# Patient Record
Sex: MALE | Race: BLACK OR AFRICAN AMERICAN | Employment: UNEMPLOYED | URBAN - METROPOLITAN AREA
[De-identification: names, ages, dates, MRNs, and addresses within clinical notes are randomized per-mention and may not be internally consistent; named-entity substitution may affect disease eponyms.]

---

## 2017-07-25 ENCOUNTER — OFFICE VISIT (OUTPATIENT)
Dept: ORTHOPEDIC SURGERY | Age: 57
End: 2017-07-25

## 2017-07-25 VITALS — RESPIRATION RATE: 16 BRPM | BODY MASS INDEX: 27.48 KG/M2 | WEIGHT: 221 LBS | HEIGHT: 75 IN

## 2017-07-25 DIAGNOSIS — M79.642 PAIN OF LEFT HAND: ICD-10-CM

## 2017-07-25 DIAGNOSIS — M79.641 RIGHT HAND PAIN: Primary | ICD-10-CM

## 2017-07-25 PROCEDURE — 99203 OFFICE O/P NEW LOW 30 MIN: CPT | Performed by: PHYSICIAN ASSISTANT

## 2017-07-25 RX ORDER — MELOXICAM 7.5 MG/1
15 TABLET ORAL DAILY
Qty: 30 TABLET | Refills: 3 | Status: SHIPPED | OUTPATIENT
Start: 2017-07-25 | End: 2018-01-18 | Stop reason: ALTCHOICE

## 2017-07-25 ASSESSMENT — ENCOUNTER SYMPTOMS
EYES NEGATIVE: 1
GASTROINTESTINAL NEGATIVE: 1
RESPIRATORY NEGATIVE: 1

## 2017-07-26 ENCOUNTER — TELEPHONE (OUTPATIENT)
Dept: ORTHOPEDIC SURGERY | Age: 57
End: 2017-07-26

## 2017-07-26 DIAGNOSIS — M79.641 RIGHT HAND PAIN: Primary | ICD-10-CM

## 2017-08-16 ENCOUNTER — TELEPHONE (OUTPATIENT)
Dept: ORTHOPEDIC SURGERY | Age: 57
End: 2017-08-16

## 2017-08-16 RX ORDER — NAPROXEN 500 MG/1
500 TABLET ORAL 2 TIMES DAILY PRN
Qty: 30 TABLET | Refills: 3 | Status: SHIPPED | OUTPATIENT
Start: 2017-08-16 | End: 2018-01-18 | Stop reason: SDUPTHER

## 2017-08-21 ENCOUNTER — OFFICE VISIT (OUTPATIENT)
Dept: FAMILY MEDICINE CLINIC | Age: 57
End: 2017-08-21

## 2017-08-21 VITALS
HEIGHT: 72 IN | DIASTOLIC BLOOD PRESSURE: 86 MMHG | WEIGHT: 217.4 LBS | HEART RATE: 75 BPM | BODY MASS INDEX: 29.45 KG/M2 | OXYGEN SATURATION: 98 % | SYSTOLIC BLOOD PRESSURE: 132 MMHG

## 2017-08-21 DIAGNOSIS — M79.641 BILATERAL HAND PAIN: Primary | ICD-10-CM

## 2017-08-21 DIAGNOSIS — F17.200 TOBACCO DEPENDENCY: ICD-10-CM

## 2017-08-21 DIAGNOSIS — M79.642 BILATERAL HAND PAIN: Primary | ICD-10-CM

## 2017-08-21 PROCEDURE — 99203 OFFICE O/P NEW LOW 30 MIN: CPT | Performed by: FAMILY MEDICINE

## 2017-08-21 RX ORDER — NAPROXEN 500 MG/1
500 TABLET ORAL 2 TIMES DAILY PRN
Qty: 60 TABLET | Refills: 1 | Status: SHIPPED | OUTPATIENT
Start: 2017-08-21 | End: 2017-08-21 | Stop reason: SDUPTHER

## 2017-08-21 ASSESSMENT — ENCOUNTER SYMPTOMS
SINUS PRESSURE: 0
COUGH: 0
DIARRHEA: 0
SORE THROAT: 0
VOMITING: 0
BACK PAIN: 0
SHORTNESS OF BREATH: 0
BLOOD IN STOOL: 0
EYE DISCHARGE: 0
NAUSEA: 0

## 2017-08-21 ASSESSMENT — PATIENT HEALTH QUESTIONNAIRE - PHQ9
1. LITTLE INTEREST OR PLEASURE IN DOING THINGS: 0
2. FEELING DOWN, DEPRESSED OR HOPELESS: 0
SUM OF ALL RESPONSES TO PHQ QUESTIONS 1-9: 0
SUM OF ALL RESPONSES TO PHQ9 QUESTIONS 1 & 2: 0

## 2017-08-23 ENCOUNTER — TELEPHONE (OUTPATIENT)
Dept: FAMILY MEDICINE CLINIC | Age: 57
End: 2017-08-23

## 2018-01-18 ENCOUNTER — OFFICE VISIT (OUTPATIENT)
Dept: FAMILY MEDICINE CLINIC | Age: 58
End: 2018-01-18

## 2018-01-18 VITALS
HEART RATE: 61 BPM | HEIGHT: 72 IN | OXYGEN SATURATION: 95 % | BODY MASS INDEX: 29.82 KG/M2 | DIASTOLIC BLOOD PRESSURE: 88 MMHG | RESPIRATION RATE: 18 BRPM | SYSTOLIC BLOOD PRESSURE: 136 MMHG | WEIGHT: 220.2 LBS

## 2018-01-18 DIAGNOSIS — Z11.4 SCREENING FOR HIV (HUMAN IMMUNODEFICIENCY VIRUS): ICD-10-CM

## 2018-01-18 DIAGNOSIS — M05.79 RHEUMATOID ARTHRITIS INVOLVING MULTIPLE SITES WITH POSITIVE RHEUMATOID FACTOR (HCC): Primary | ICD-10-CM

## 2018-01-18 DIAGNOSIS — Z13.0 SCREENING FOR DEFICIENCY ANEMIA: ICD-10-CM

## 2018-01-18 DIAGNOSIS — Z11.59 ENCOUNTER FOR HEPATITIS C SCREENING TEST FOR LOW RISK PATIENT: ICD-10-CM

## 2018-01-18 DIAGNOSIS — Z13.1 SCREENING FOR DIABETES MELLITUS: ICD-10-CM

## 2018-01-18 DIAGNOSIS — Z13.220 SCREENING FOR LIPID DISORDERS: ICD-10-CM

## 2018-01-18 DIAGNOSIS — Z12.11 SCREENING FOR COLON CANCER: ICD-10-CM

## 2018-01-18 LAB
A/G RATIO: 1.4 (ref 1.1–2.2)
ALBUMIN SERPL-MCNC: 4 G/DL (ref 3.4–5)
ALP BLD-CCNC: 66 U/L (ref 40–129)
ALT SERPL-CCNC: 16 U/L (ref 10–40)
ANION GAP SERPL CALCULATED.3IONS-SCNC: 11 MMOL/L (ref 3–16)
AST SERPL-CCNC: 16 U/L (ref 15–37)
BASOPHILS ABSOLUTE: 0 K/UL (ref 0–0.2)
BASOPHILS RELATIVE PERCENT: 0.8 %
BILIRUB SERPL-MCNC: 0.6 MG/DL (ref 0–1)
BUN BLDV-MCNC: 12 MG/DL (ref 7–20)
CALCIUM SERPL-MCNC: 9.1 MG/DL (ref 8.3–10.6)
CHLORIDE BLD-SCNC: 107 MMOL/L (ref 99–110)
CHOLESTEROL, TOTAL: 146 MG/DL (ref 0–199)
CO2: 24 MMOL/L (ref 21–32)
CREAT SERPL-MCNC: 0.9 MG/DL (ref 0.9–1.3)
EOSINOPHILS ABSOLUTE: 0.1 K/UL (ref 0–0.6)
EOSINOPHILS RELATIVE PERCENT: 2.6 %
GFR AFRICAN AMERICAN: >60
GFR NON-AFRICAN AMERICAN: >60
GLOBULIN: 2.9 G/DL
GLUCOSE BLD-MCNC: 94 MG/DL (ref 70–99)
HCT VFR BLD CALC: 42.2 % (ref 40.5–52.5)
HDLC SERPL-MCNC: 35 MG/DL (ref 40–60)
HEMOGLOBIN: 14.3 G/DL (ref 13.5–17.5)
HEPATITIS C ANTIBODY INTERPRETATION: NORMAL
LDL CHOLESTEROL CALCULATED: 82 MG/DL
LYMPHOCYTES ABSOLUTE: 2.3 K/UL (ref 1–5.1)
LYMPHOCYTES RELATIVE PERCENT: 43.1 %
MCH RBC QN AUTO: 34 PG (ref 26–34)
MCHC RBC AUTO-ENTMCNC: 33.9 G/DL (ref 31–36)
MCV RBC AUTO: 100.3 FL (ref 80–100)
MONOCYTES ABSOLUTE: 0.5 K/UL (ref 0–1.3)
MONOCYTES RELATIVE PERCENT: 10.3 %
NEUTROPHILS ABSOLUTE: 2.3 K/UL (ref 1.7–7.7)
NEUTROPHILS RELATIVE PERCENT: 43.2 %
PDW BLD-RTO: 13.6 % (ref 12.4–15.4)
PLATELET # BLD: 298 K/UL (ref 135–450)
PMV BLD AUTO: 8.7 FL (ref 5–10.5)
POTASSIUM SERPL-SCNC: 4.8 MMOL/L (ref 3.5–5.1)
RBC # BLD: 4.21 M/UL (ref 4.2–5.9)
RHEUMATOID FACTOR: 15 IU/ML
SODIUM BLD-SCNC: 142 MMOL/L (ref 136–145)
TOTAL PROTEIN: 6.9 G/DL (ref 6.4–8.2)
TRIGL SERPL-MCNC: 146 MG/DL (ref 0–150)
VLDLC SERPL CALC-MCNC: 29 MG/DL
WBC # BLD: 5.3 K/UL (ref 4–11)

## 2018-01-18 PROCEDURE — G8427 DOCREV CUR MEDS BY ELIG CLIN: HCPCS | Performed by: NURSE PRACTITIONER

## 2018-01-18 PROCEDURE — 4004F PT TOBACCO SCREEN RCVD TLK: CPT | Performed by: NURSE PRACTITIONER

## 2018-01-18 PROCEDURE — 36415 COLL VENOUS BLD VENIPUNCTURE: CPT | Performed by: NURSE PRACTITIONER

## 2018-01-18 PROCEDURE — G8484 FLU IMMUNIZE NO ADMIN: HCPCS | Performed by: NURSE PRACTITIONER

## 2018-01-18 PROCEDURE — 99215 OFFICE O/P EST HI 40 MIN: CPT | Performed by: NURSE PRACTITIONER

## 2018-01-18 PROCEDURE — 3017F COLORECTAL CA SCREEN DOC REV: CPT | Performed by: NURSE PRACTITIONER

## 2018-01-18 PROCEDURE — G8417 CALC BMI ABV UP PARAM F/U: HCPCS | Performed by: NURSE PRACTITIONER

## 2018-01-18 RX ORDER — MELOXICAM 7.5 MG/1
15 TABLET ORAL DAILY
Qty: 30 TABLET | Refills: 3 | Status: CANCELLED | OUTPATIENT
Start: 2018-01-18

## 2018-01-18 RX ORDER — NAPROXEN 500 MG/1
500 TABLET ORAL 2 TIMES DAILY PRN
Qty: 30 TABLET | Refills: 2 | Status: SHIPPED | OUTPATIENT
Start: 2018-01-18 | End: 2019-02-18 | Stop reason: SDUPTHER

## 2018-01-18 RX ORDER — AZITHROMYCIN 250 MG/1
250 TABLET, FILM COATED ORAL DAILY
COMMUNITY

## 2018-01-18 RX ORDER — TRAMADOL HYDROCHLORIDE 50 MG/1
50 TABLET ORAL 2 TIMES DAILY PRN
Qty: 60 TABLET | Refills: 0 | Status: SHIPPED | OUTPATIENT
Start: 2018-01-18 | End: 2018-02-17

## 2018-01-18 RX ORDER — OSELTAMIVIR PHOSPHATE 75 MG/1
75 CAPSULE ORAL 2 TIMES DAILY
COMMUNITY

## 2018-01-18 ASSESSMENT — ENCOUNTER SYMPTOMS
NAUSEA: 0
BACK PAIN: 0
ABDOMINAL DISTENTION: 0
VOMITING: 0
SHORTNESS OF BREATH: 0

## 2018-01-18 NOTE — PROGRESS NOTES
SUBJECTIVE:  Jeremiah Chau   1960   male   Allergies   Allergen Reactions    Pcn [Penicillins]        Chief Complaint   Patient presents with    Establish Care      HPI   Here to establish PCP. Treated in the ED one week ago for pneumonia and flu. Has completed his medication. Feeling improved. History of RA, but states he has not seen a specialist for treatment in the past. Has been taking tramadol and naproxen, and has run out of the tramadol. Was treated at [facility] last August for his chronic hand pain secondary to the RA. Has chronic joint pain with intermittent swelling to his hands and lower extremities. Past Medical History:   Diagnosis Date    Bilateral hand pain     Pneumonia 01/07/2018    treated in ED    Rheumatoid arthritis ([facility] [address]     Smoker      Social History     Social History    Marital status:      Spouse name: Lucretia Villalobos Number of children: 3    Years of education: 15     Occupational History    cook      Social History Main Topics    Smoking status: Current Every Day Smoker     Packs/day: 0.50     Types: Cigarettes    Smokeless tobacco: Never Used    Alcohol use Yes      Comment: drinks occasionaly     Drug use: No    Sexual activity: Yes     Partners: Female     Other Topics Concern    Not on file     Social History Narrative    NO family history of violence    YES does feel safe in the home    NO gun in the home         Family History   Problem Relation Age of Onset    Cancer Mother     Diabetes Mother     Allergies Mother     High Cholesterol Mother     Heart Failure Mother     Migraines Mother     Hypertension Mother     Diabetes Sister     High Blood Pressure Sister     Osteoarthritis Father     Rheum Arthritis Father     Asthma Father     Mental Illness Brother      anger management    Hypertension Brother     Diabetes Brother     No Known Problems Sister     No Known Problems Sister     No Known Problems Sister      History reviewed.  No

## 2018-01-18 NOTE — LETTER
disease. Overdose or dangerous interactions with alcohol and other medications may occur, leading to death. Hyperalgesia may develop, in which patients receiving opioids for the treatment of pain may actually become more sensitive to certain painful stimuli, and in some cases, experience pain from ordinarily non-painful stimuli. Women between the ages of 14-53 who could become pregnant should carefully weigh the risks and benefits of opioids with their physicians, as these medications increase the risk of pregnancy complications, including miscarriage,  delivery and stillbirth. It is also possible for babies to be born addicted to opioids. Opioid dependence withdrawal symptoms may include; feelings of uneasiness, increased pain, irritability, belly pain, diarrhea, sweats and goose-flesh. Benzodiazepines and non-benzodiazepine sleep medications: These medications can lead to problems such as addiction/dependence, sedation, fatigue, lightheadedness, dizziness, incoordination, falls, depression, hallucinations, and impaired judgment, memory and concentration. The ability to drive and operate machinery may also be affected. Abnormal sleep-related behaviors have been reported, including sleep walking, driving, making telephone calls, eating, or having sex while not fully awake. These medications can suppress breathing and worsen sleep apnea, particularly when combined with alcohol or other sedating medications, potentially leading to death. Dependence withdrawal symptoms may include tremors, anxiety, hallucinations and seizures. Stimulants:  Common adverse effects include addiction/dependence, increased blood pressure and heart rate, decreased appetite, nausea, involuntary weight loss, insomnia, irritability, and headaches.   These risks may increase when these medications are combined with other stimulants, such as caffeine pills or energy drinks, certain weight loss which may also result in my being prevented from receiving further care from this office. · Other:____________________________________________________________________    AGREEMENT:    I have read the above and have had all of my questions answered. For chronic disease management, I know that my symptoms can be managed with many types of treatments. A chronic medication trial may be part of my treatment, but I must be an active participant in my care. Medication therapy is only one part of my symptom management plan. In some cases, there may be limited scientific evidence to support the chronic use of certain medications to improve symptoms and daily function. Furthermore, in certain circumstances, there may be scientific information that suggests that use of chronic controlled substances may actually worsen my symptoms and increase my risk of unintentional death directly related to this medication therapy. I know that if my provider feels my risk from controlled medications is greater than my benefit, I will have my controlled substance medication(s) compassionately lowered or removed altogether. I agree to a controlled substance medication trial.      I further agree to allow this office to contact family or friends if there are concerns about my safety and use of the controlled medications. I have agreed to use the following medications above as instructed by my physician and as stated in this [id number].      Patient Signature:  ______________________  Date:1/18/2018 or _____________    Provider Signature:______________________  Date:1/18/2018 or _____________

## 2018-01-19 LAB
HIV AG/AB: NORMAL
HIV ANTIGEN: NORMAL
HIV-1 ANTIBODY: NORMAL
HIV-2 AB: NORMAL

## 2018-04-18 ENCOUNTER — TELEPHONE (OUTPATIENT)
Dept: FAMILY MEDICINE CLINIC | Age: 58
End: 2018-04-18

## 2019-02-18 DIAGNOSIS — M05.79 RHEUMATOID ARTHRITIS INVOLVING MULTIPLE SITES WITH POSITIVE RHEUMATOID FACTOR (HCC): ICD-10-CM

## 2019-02-19 RX ORDER — NAPROXEN 500 MG/1
TABLET ORAL
Qty: 30 TABLET | Refills: 2 | Status: SHIPPED | OUTPATIENT
Start: 2019-02-19

## 2021-07-20 ENCOUNTER — HOSPITAL ENCOUNTER (EMERGENCY)
Age: 61
Discharge: HOME | End: 2021-07-20
Payer: COMMERCIAL

## 2021-07-20 VITALS
HEIGHT: 75 IN | OXYGEN SATURATION: 95 % | SYSTOLIC BLOOD PRESSURE: 156 MMHG | BODY MASS INDEX: 28.6 KG/M2 | HEART RATE: 74 BPM | TEMPERATURE: 98 F | RESPIRATION RATE: 17 BRPM | WEIGHT: 230 LBS | DIASTOLIC BLOOD PRESSURE: 89 MMHG

## 2021-07-20 DIAGNOSIS — M25.50 POLYARTHRALGIA: Primary | ICD-10-CM

## 2021-07-20 PROCEDURE — 99283 EMERGENCY DEPT VISIT LOW MDM: CPT

## 2021-07-20 RX ORDER — TRAMADOL HYDROCHLORIDE 50 MG/1
50 TABLET ORAL EVERY 6 HOURS PRN
Qty: 15 TABLET | Refills: 0 | Status: SHIPPED | OUTPATIENT
Start: 2021-07-20 | End: 2021-07-23

## 2021-07-20 ASSESSMENT — PAIN DESCRIPTION - ORIENTATION: ORIENTATION: RIGHT

## 2021-07-20 ASSESSMENT — PAIN SCALES - GENERAL: PAINLEVEL_OUTOF10: 7

## 2021-07-20 ASSESSMENT — PAIN DESCRIPTION - PAIN TYPE: TYPE: ACUTE PAIN

## 2021-07-20 ASSESSMENT — PAIN DESCRIPTION - LOCATION: LOCATION: ELBOW

## 2021-07-20 NOTE — ED NOTES
Bed: ED-02  Expected date:   Expected time:   Means of arrival: Walk In  Comments:     Alfredo Parry RN  07/20/21 6444

## 2021-07-20 NOTE — ED PROVIDER NOTES
Triage Chief Complaint:   No chief complaint on file. [place]:  Today in the ED I had the pleasure of caring for Richy Almonte who is a 57 y.o. male that presents today complaining of arthritis pain. All on the L side, his ankle wrist and elbow. He states it is exacerbated by working. He is a cook at [employer] He states he has had swelling over the past several days. The swelling is going down today. He states he missed work yesterday secondary to the pain. He has hx of RA.      ROS:  REVIEW OF SYSTEMS    At least 10 systems reviewed      All other review of systems are negative  See HPI and nursing notes for additional information       Past Medical History:   Diagnosis Date    Bilateral hand pain     Pneumonia 01/07/2018    treated in ED    Rheumatoid arthritis ([facility] [address]     Smoker      History reviewed. No pertinent surgical history.   Family History   Problem Relation Age of Onset    Cancer Mother     Diabetes Mother     Allergies Mother     High Cholesterol Mother     Heart Failure Mother     Migraines Mother     Hypertension Mother     Diabetes Sister     High Blood Pressure Sister     Osteoarthritis Father     Rheum Arthritis Father     Asthma Father     Mental Illness Brother         anger management    Hypertension Brother     Diabetes Brother     No Known Problems Sister     No Known Problems Sister     No Known Problems Sister      Social History     Socioeconomic History    Marital status:      Spouse name: Lisbet Dumas Number of children: 3    Years of education: 15    Highest education level: Not on file   Occupational History    Occupation: [employer]   Tobacco Use    Smoking status: Current Every Day Smoker     Packs/day: 0.50     Types: Cigarettes    Smokeless tobacco: Never Used   Substance and Sexual Activity    Alcohol use: Yes     Comment: drinks occasionaly     Drug use: No    Sexual activity: Yes     Partners: Female   Other Topics Concern    Not on file   Social History Narrative    NO family history of violence    YES does feel safe in the home    NO gun in the home     Social Determinants of Health     Financial Resource Strain:     Difficulty of Paying Living Expenses:    Food Insecurity:     Worried About Running Out of Food in the Last Year:     [address] in the Last Year:    Transportation Needs:     Lack of Transportation (Medical):  Lack of Transportation (Non-Medical):    Physical Activity:     Days of Exercise per Week:     Minutes of Exercise per Session:    Stress:     Feeling of Stress :    Social Connections:     Frequency of Communication with Friends and Family:     Frequency of Social Gatherings with Friends and Family:     Attends Yarsanism Services:     Active Member of Clubs or Organizations:     Attends Club or Organization Meetings:     Marital Status:    Intimate Partner Violence:     Fear of Current or Ex-Partner:     Emotionally Abused:     Physically Abused:     Sexually Abused:      No current facility-administered medications for this encounter. Current Outpatient Medications   Medication Sig Dispense Refill    traMADol (ULTRAM) 50 MG tablet Take 1 tablet by mouth every 6 hours as needed for Pain for up to 3 days. 15 tablet 0    naproxen (NAPROSYN) 500 MG tablet TAKE 1 TABLET BY MOUTH 2 TIMES DAILY AS NEEDED FOR PAIN TAKE WITH FOOD 30 tablet 2    oseltamivir (TAMIFLU) 75 MG capsule Take 75 mg by mouth 2 times daily      azithromycin (ZITHROMAX) 250 MG tablet Take 250 mg by mouth daily       Allergies   Allergen Reactions    Pcn [Penicillins]        Nursing Notes Reviewed    Physical Exam:  ED Triage Vitals [07/20/21 1014]   Enc Vitals Group      BP (!) 186/106      Pulse 74      Resp 17      Temp 98 °F (36.7 °C)      Temp Source Oral      SpO2 95 %      Weight 230 lb (104.3 kg)      Height 6' 3\" (1.905 m)      Head Circumference       Peak Flow       Pain Score       Pain Loc       Pain Edu? Excl.  in [address]? General :Patient is awake alert oriented person place and time no acute distress nontoxic appearing  HEENT: Pupils are equally round and reactive to light extraocular motors are intact conjunctivae clear sclerae white there is no injection no icterus. Nose without any rhinorrhea or epistaxis. Oral mucosa is moist no exudate buccal mucosa shows no ulcerations. Uvula is midline    Neck: Neck is supple full range of motion trachea midline thyroid nonpalpable  Cardiac: Heart regular rate rhythm no murmurs rubs clicks or gallops  Lungs: Lungs are clear to auscultation there is no wheezing rhonchi or rales. There is no use of accessory muscles no nasal flaring identified. Chest wall: There is no tenderness to palpation over the chest wall or over ribs  Abdomen: Abdomen is soft nontender nondistended. There is no firm or pulsatile masses no rebound rigidity or guarding negative Engle's negative McBurney, no peritoneal signs  Suprapubic:  there is no tenderness to palpation over the external bladder   Musculoskeletal: 5 out of 5 strength in all 4 extremities full flexion extension abduction and adduction supination pronation of all extremities and all digits. No obvious muscle atrophy is noted. No focal muscle deficits are appreciated. Right shoulder shows no tenderness palpation. Full range of motion in all directions. Right elbow does have some mild olecranon bursitis noted. With full range of motion. No crepitus. Compartments are soft proximally and distally. Radial ulnar pulse 2+. There is no crepitus noted there is full range of motion of patient's wrist.  Without bony tenderness palpation no scaphoid tenderness palpation.  strength 5/5. KNEE/ANKLE/FOOT: right Medial malleolus is not tender to palpation. Lateral malleolus is not tender to palpation. Navicular is not tender to palpation. 5th metatarsal head is not tender to palpation. Proximal fibula is not tender to palpation.  Patella is not tender to palpation. The calves are not tender to palpation. Active range of motion of the joints is not present without ligamentous laxity. There is no obvious deformity. negative joint effusions. Dermatology: Skin is warm and dry there is no obvious abscesses lacerations or lesions noted  Psych: Mentation is grossly normal cognition is grossly normal. Affect is appropriate  Neuro: Motor intact sensory intact cranial nerves II through XII are intact level of consciousness is normal cerebellar function is normal reflexes are grossly normal. No evidence of incontinence or loss of bowel or bladder no saddle anesthesia noted Lymphatic: There is no submandibular or cervical adenopathy appreciated. I have reviewed and interpreted all of the currently available lab results from this visit (if applicable):  No results found for this visit on 07/20/21. Radiographs (if obtained):  [] The following radiograph was interpreted by myself in the absence of a radiologist:   [] Radiologist's Report Reviewed:  No orders to display       EKG (if obtained):   Please See Note of attending physician for EKG interpretation. Chart review shows recent radiograph(s):  No results found. MDM:     Interventions given this visit:   Orders Placed This Encounter   Medications    traMADol (ULTRAM) 50 MG tablet     Sig: Take 1 tablet by mouth every 6 hours as needed for Pain for up to 3 days. Dispense:  15 tablet     Refill:  0       I estimate there is LOW risk for (including but not limited to) OPEN FRACTURE, COMPARTMENT SYNDROME, DEEP VENOUS THROMBOSIS, COMPLETE  TENDON RUPTURE, NECROTIZING FASCIITIS, or ACUTE ARTERIAL INJURY, thus I consider the discharge disposition reasonable. Wally Crum (or their surrogate) and I have discussed the diagnosis and risks, and we agree with discharging home with close follow-up. We also discussed returning to the Emergency Department immediately if new or worsening symptoms occur. We have discussed the symptoms which are most concerning that necessitate immediate return. I independently managed patient today in the ED    BP (!) 186/106   Pulse 74   Temp 98 °F (36.7 °C) (Oral)   Resp 17   Ht 6' 3\" (1.905 m)   Wt 230 lb (104.3 kg)   SpO2 95%   BMI 28.75 kg/m²       Clinical Impression:  1. Polyarthralgia        Disposition referral (if applicable):  [facility] Emergency Department  [address] [facility]  [phone]    If symptoms worsen or persist    Disposition medications (if applicable):  New Prescriptions    TRAMADOL (ULTRAM) 50 MG TABLET    Take 1 tablet by mouth every 6 hours as needed for Pain for up to 3 days. Comment: Please note this report has been produced using speech recognition software and may contain errors related to that system including errors in grammar, punctuation, and spelling, as well as words and phrases that may be inappropriate. If there are any questions or concerns please feel free to contact the dictating provider for clarification.       [id number], [address] [place] [address]  07/20/21 1580

## 2021-07-20 NOTE — ED TRIAGE NOTES
Pt has been having some problems with joint swelling for the past several years; pt states that he does have problems with arthritis; pt states that he did have some swelling noted in right hand, right ankle and right elbow; the swelling in the hand and ankle did go down since Sunday; pt still has swelling noted in the right elbow;

## 2023-01-27 ENCOUNTER — APPOINTMENT (OUTPATIENT)
Dept: GENERAL RADIOLOGY | Age: 63
End: 2023-01-27
Payer: COMMERCIAL

## 2023-01-27 ENCOUNTER — HOSPITAL ENCOUNTER (INPATIENT)
Age: 63
LOS: 6 days | Discharge: HOME | End: 2023-02-03
Attending: EMERGENCY MEDICINE | Admitting: STUDENT IN AN ORGANIZED HEALTH CARE EDUCATION/TRAINING PROGRAM
Payer: COMMERCIAL

## 2023-01-27 DIAGNOSIS — R09.02 HYPOXIA: ICD-10-CM

## 2023-01-27 DIAGNOSIS — R06.00 DYSPNEA AND RESPIRATORY ABNORMALITIES: ICD-10-CM

## 2023-01-27 DIAGNOSIS — R06.89 DYSPNEA AND RESPIRATORY ABNORMALITIES: ICD-10-CM

## 2023-01-27 DIAGNOSIS — J18.9 MULTIFOCAL PNEUMONIA: Primary | ICD-10-CM

## 2023-01-27 DIAGNOSIS — R07.9 CHEST PAIN, UNSPECIFIED TYPE: ICD-10-CM

## 2023-01-27 LAB
ALBUMIN SERPL-MCNC: 3.8 GM/DL (ref 3.4–5)
ALP BLD-CCNC: 78 IU/L (ref 40–129)
ALT SERPL-CCNC: 13 U/L (ref 10–40)
ANION GAP SERPL CALCULATED.3IONS-SCNC: 12 MMOL/L (ref 4–16)
AST SERPL-CCNC: 27 IU/L (ref 15–37)
BASE EXCESS: 3 (ref 0–3.3)
BASOPHILS ABSOLUTE: 0 K/CU MM
BASOPHILS RELATIVE PERCENT: 0.5 % (ref 0–1)
BILIRUB SERPL-MCNC: 1 MG/DL (ref 0–1)
BUN BLDV-MCNC: 17 MG/DL (ref 6–23)
CALCIUM SERPL-MCNC: 8.4 MG/DL (ref 8.3–10.6)
CHLORIDE BLD-SCNC: 101 MMOL/L (ref 99–110)
CO2: 21 MMOL/L (ref 21–32)
CREAT SERPL-MCNC: 1.2 MG/DL (ref 0.9–1.3)
DIFFERENTIAL TYPE: ABNORMAL
EOSINOPHILS ABSOLUTE: 0 K/CU MM
EOSINOPHILS RELATIVE PERCENT: 0 % (ref 0–3)
GFR SERPL CREATININE-BSD FRML MDRD: >60 ML/MIN/1.73M2
GLUCOSE BLD-MCNC: 106 MG/DL (ref 70–99)
HCO3 VENOUS: 22.5 MMOL/L (ref 19–25)
HCT VFR BLD CALC: 41 % (ref 42–52)
HEMOGLOBIN: 13.4 GM/DL (ref 13.5–18)
IMMATURE NEUTROPHIL %: 0 % (ref 0–0.43)
LYMPHOCYTES ABSOLUTE: 1 K/CU MM
LYMPHOCYTES RELATIVE PERCENT: 13.1 % (ref 24–44)
MAGNESIUM: 1.9 MG/DL (ref 1.8–2.4)
MCH RBC QN AUTO: 30.7 PG (ref 27–31)
MCHC RBC AUTO-ENTMCNC: 32.7 % (ref 32–36)
MCV RBC AUTO: 94 FL (ref 78–100)
MONOCYTES ABSOLUTE: 0.3 K/CU MM
MONOCYTES RELATIVE PERCENT: 4.1 % (ref 0–4)
NUCLEATED RBC %: 0 %
O2 SAT, VEN: 88.6 % (ref 50–70)
PCO2, VEN: 39 MMHG (ref 38–52)
PDW BLD-RTO: 13.6 % (ref 11.7–14.9)
PH VENOUS: 7.37 (ref 7.32–7.42)
PLATELET # BLD: 176 K/CU MM (ref 140–440)
PMV BLD AUTO: 9.8 FL (ref 7.5–11.1)
PO2, VEN: 65 MMHG (ref 28–48)
POTASSIUM SERPL-SCNC: 4.1 MMOL/L (ref 3.5–5.1)
PRO-BNP: 1901 PG/ML
RAPID INFLUENZA  B AGN: NEGATIVE
RAPID INFLUENZA A AGN: NEGATIVE
RBC # BLD: 4.36 M/CU MM (ref 4.6–6.2)
SEGMENTED NEUTROPHILS ABSOLUTE COUNT: 6.4 K/CU MM
SEGMENTED NEUTROPHILS RELATIVE PERCENT: 82.3 % (ref 36–66)
SODIUM BLD-SCNC: 134 MMOL/L (ref 135–145)
TOTAL IMMATURE NEUTOROPHIL: 0 K/CU MM
TOTAL NUCLEATED RBC: 0 K/CU MM
TOTAL PROTEIN: 7.2 GM/DL (ref 6.4–8.2)
TROPONIN T: <0.01 NG/ML
WBC # BLD: 7.7 K/CU MM (ref 4–10.5)

## 2023-01-27 PROCEDURE — 96375 TX/PRO/DX INJ NEW DRUG ADDON: CPT

## 2023-01-27 PROCEDURE — 82805 BLOOD GASES W/O2 SATURATION: CPT

## 2023-01-27 PROCEDURE — 6370000000 HC RX 637 (ALT 250 FOR IP): Performed by: EMERGENCY MEDICINE

## 2023-01-27 PROCEDURE — 6360000002 HC RX W HCPCS: Performed by: EMERGENCY MEDICINE

## 2023-01-27 PROCEDURE — 84484 ASSAY OF TROPONIN QUANT: CPT

## 2023-01-27 PROCEDURE — 94640 AIRWAY INHALATION TREATMENT: CPT

## 2023-01-27 PROCEDURE — 99285 EMERGENCY DEPT VISIT HI MDM: CPT

## 2023-01-27 PROCEDURE — 80053 COMPREHEN METABOLIC PANEL: CPT

## 2023-01-27 PROCEDURE — 83735 ASSAY OF MAGNESIUM: CPT

## 2023-01-27 PROCEDURE — 2500000003 HC RX 250 WO HCPCS: Performed by: EMERGENCY MEDICINE

## 2023-01-27 PROCEDURE — 71045 X-RAY EXAM CHEST 1 VIEW: CPT

## 2023-01-27 PROCEDURE — 96365 THER/PROPH/DIAG IV INF INIT: CPT

## 2023-01-27 PROCEDURE — 96366 THER/PROPH/DIAG IV INF ADDON: CPT

## 2023-01-27 PROCEDURE — 83880 ASSAY OF NATRIURETIC PEPTIDE: CPT

## 2023-01-27 PROCEDURE — 87635 SARS-COV-2 COVID-19 AMP PRB: CPT

## 2023-01-27 PROCEDURE — 85025 COMPLETE CBC W/AUTO DIFF WBC: CPT

## 2023-01-27 PROCEDURE — 2700000000 HC OXYGEN THERAPY PER DAY

## 2023-01-27 PROCEDURE — 87804 INFLUENZA ASSAY W/OPTIC: CPT

## 2023-01-27 RX ORDER — MORPHINE SULFATE 2 MG/ML
2 INJECTION, SOLUTION INTRAMUSCULAR; INTRAVENOUS EVERY 30 MIN PRN
Status: DISCONTINUED | OUTPATIENT
Start: 2023-01-27 | End: 2023-01-27

## 2023-01-27 RX ORDER — METHYLPREDNISOLONE SODIUM SUCCINATE 125 MG/2ML
125 INJECTION, POWDER, LYOPHILIZED, FOR SOLUTION INTRAMUSCULAR; INTRAVENOUS ONCE
Status: COMPLETED | OUTPATIENT
Start: 2023-01-27 | End: 2023-01-27

## 2023-01-27 RX ORDER — ALBUTEROL SULFATE 90 UG/1
2 AEROSOL, METERED RESPIRATORY (INHALATION) ONCE
Status: COMPLETED | OUTPATIENT
Start: 2023-01-27 | End: 2023-01-27

## 2023-01-27 RX ORDER — ONDANSETRON 2 MG/ML
4 INJECTION INTRAMUSCULAR; INTRAVENOUS EVERY 30 MIN PRN
Status: DISCONTINUED | OUTPATIENT
Start: 2023-01-27 | End: 2023-01-28

## 2023-01-27 RX ORDER — ASPIRIN 81 MG/1
324 TABLET, CHEWABLE ORAL ONCE
Status: COMPLETED | OUTPATIENT
Start: 2023-01-27 | End: 2023-01-27

## 2023-01-27 RX ORDER — MAGNESIUM SULFATE IN WATER 40 MG/ML
2000 INJECTION, SOLUTION INTRAVENOUS ONCE
Status: COMPLETED | OUTPATIENT
Start: 2023-01-27 | End: 2023-01-28

## 2023-01-27 RX ORDER — MORPHINE SULFATE 2 MG/ML
4 INJECTION, SOLUTION INTRAMUSCULAR; INTRAVENOUS EVERY 30 MIN PRN
Status: DISCONTINUED | OUTPATIENT
Start: 2023-01-27 | End: 2023-01-28

## 2023-01-27 RX ORDER — GUAIFENESIN 200 MG/10ML
200 LIQUID ORAL ONCE
Status: COMPLETED | OUTPATIENT
Start: 2023-01-27 | End: 2023-01-27

## 2023-01-27 RX ADMIN — IPRATROPIUM BROMIDE 2 PUFF: 17 AEROSOL, METERED RESPIRATORY (INHALATION) at 23:25

## 2023-01-27 RX ADMIN — ONDANSETRON 4 MG: 2 INJECTION INTRAMUSCULAR; INTRAVENOUS at 23:06

## 2023-01-27 RX ADMIN — MORPHINE SULFATE 2 MG: 2 INJECTION, SOLUTION INTRAMUSCULAR; INTRAVENOUS at 23:07

## 2023-01-27 RX ADMIN — METHYLPREDNISOLONE SODIUM SUCCINATE 125 MG: 125 INJECTION, POWDER, FOR SOLUTION INTRAMUSCULAR; INTRAVENOUS at 23:06

## 2023-01-27 RX ADMIN — ASPIRIN 81 MG 324 MG: 81 TABLET ORAL at 22:58

## 2023-01-27 RX ADMIN — MAGNESIUM SULFATE HEPTAHYDRATE 2000 MG: 2 INJECTION, SOLUTION INTRAVENOUS at 23:40

## 2023-01-27 RX ADMIN — GUAIFENESIN 200 MG: 100 SOLUTION ORAL at 23:41

## 2023-01-27 RX ADMIN — ALBUTEROL SULFATE 2 PUFF: 90 AEROSOL, METERED RESPIRATORY (INHALATION) at 23:25

## 2023-01-27 ASSESSMENT — PAIN DESCRIPTION - LOCATION: LOCATION: CHEST

## 2023-01-27 ASSESSMENT — ENCOUNTER SYMPTOMS: SHORTNESS OF BREATH: 1

## 2023-01-27 ASSESSMENT — PAIN SCALES - GENERAL
PAINLEVEL_OUTOF10: 8
PAINLEVEL_OUTOF10: 5

## 2023-01-27 ASSESSMENT — PAIN - FUNCTIONAL ASSESSMENT
PAIN_FUNCTIONAL_ASSESSMENT: 0-10
PAIN_FUNCTIONAL_ASSESSMENT: 0-10

## 2023-01-27 NOTE — LETTER
[facility] ICU Stepdown  [address] [address]  Phone: [phone]  Fax: [phone]    To whom it may consider:        February 3, 2023     Patient: Ran Mendoza   YOB: 1960   Date of Visit: 1/27/2023       To Whom It May Concern: It is my medical opinion that Ran Mendoza may return to work on 02/09/2023 with the following restrictions: Light duty, up to 7 hours, 4 days a week. If you have any questions or concerns, please don't hesitate to call.     Sincerely,  Maninder Centeno RN

## 2023-01-28 ENCOUNTER — APPOINTMENT (OUTPATIENT)
Dept: CT IMAGING | Age: 63
End: 2023-01-28
Payer: COMMERCIAL

## 2023-01-28 PROBLEM — J16.0 CAP (COMMUNITY ACQUIRED PNEUMONIA) DUE TO CHLAMYDIA SPECIES: Status: ACTIVE | Noted: 2023-01-28

## 2023-01-28 LAB
ADENOVIRUS DETECTION BY PCR: NOT DETECTED
ANION GAP SERPL CALCULATED.3IONS-SCNC: 14 MMOL/L (ref 4–16)
BACTERIA: NEGATIVE /HPF
BASOPHILS ABSOLUTE: 0 K/CU MM
BASOPHILS RELATIVE PERCENT: 0.2 % (ref 0–1)
BILIRUBIN URINE: NEGATIVE MG/DL
BLOOD, URINE: NEGATIVE
BORDETELLA PARAPERTUSSIS BY PCR: NOT DETECTED
BORDETELLA PERTUSSIS PCR: NOT DETECTED
BUN BLDV-MCNC: 22 MG/DL (ref 6–23)
C-REACTIVE PROTEIN, HIGH SENSITIVITY: 229.9 MG/L
CALCIUM SERPL-MCNC: 8.3 MG/DL (ref 8.3–10.6)
CHLAMYDOPHILA PNEUMONIA PCR: NOT DETECTED
CHLORIDE BLD-SCNC: 102 MMOL/L (ref 99–110)
CLARITY: ABNORMAL
CO2: 20 MMOL/L (ref 21–32)
COLOR: YELLOW
CORONAVIRUS 229E PCR: NOT DETECTED
CORONAVIRUS HKU1 PCR: NOT DETECTED
CORONAVIRUS NL63 PCR: NOT DETECTED
CORONAVIRUS OC43 PCR: NOT DETECTED
CREAT SERPL-MCNC: 1.3 MG/DL (ref 0.9–1.3)
DIFFERENTIAL TYPE: ABNORMAL
EOSINOPHILS ABSOLUTE: 0 K/CU MM
EOSINOPHILS RELATIVE PERCENT: 0 % (ref 0–3)
ESTIMATED AVERAGE GLUCOSE: 123 MG/DL
FOLATE: 13.1 NG/ML (ref 3.1–17.5)
GFR SERPL CREATININE-BSD FRML MDRD: >60 ML/MIN/1.73M2
GLUCOSE BLD-MCNC: 173 MG/DL (ref 70–99)
GLUCOSE, URINE: NEGATIVE MG/DL
HAV IGM SER IA-ACNC: NON REACTIVE
HBA1C MFR BLD: 5.9 % (ref 4.2–6.3)
HCT VFR BLD CALC: 39.9 % (ref 42–52)
HEMOGLOBIN: 12.8 GM/DL (ref 13.5–18)
HEPATITIS B CORE IGM ANTIBODY: NON REACTIVE
HEPATITIS B SURFACE ANTIGEN: NON REACTIVE
HEPATITIS C ANTIBODY: NON REACTIVE
HUMAN METAPNEUMOVIRUS PCR: ABNORMAL
IMMATURE NEUTROPHIL %: 0.2 % (ref 0–0.43)
INFLUENZA A BY PCR: NOT DETECTED
INFLUENZA A H1 (2009) PCR: NOT DETECTED
INFLUENZA A H1 PANDEMIC PCR: NOT DETECTED
INFLUENZA A H3 PCR: NOT DETECTED
INFLUENZA B BY PCR: NOT DETECTED
INR BLD: 1.21 INDEX
KETONES, URINE: NEGATIVE MG/DL
LACTATE: 1.7 MMOL/L (ref 0.5–1.9)
LEGIONELLA URINARY AG: NEGATIVE
LEUKOCYTE ESTERASE, URINE: NEGATIVE
LYMPHOCYTES ABSOLUTE: 0.5 K/CU MM
LYMPHOCYTES RELATIVE PERCENT: 9.3 % (ref 24–44)
MCH RBC QN AUTO: 30.6 PG (ref 27–31)
MCHC RBC AUTO-ENTMCNC: 32.1 % (ref 32–36)
MCV RBC AUTO: 95.5 FL (ref 78–100)
MONOCYTES ABSOLUTE: 0.1 K/CU MM
MONOCYTES RELATIVE PERCENT: 1.6 % (ref 0–4)
MUCUS: ABNORMAL HPF
MYCOPLASMA PNEUMONIAE PCR: NOT DETECTED
NITRITE URINE, QUANTITATIVE: NEGATIVE
NUCLEATED RBC %: 0 %
PARAINFLUENZA 1 PCR: NOT DETECTED
PARAINFLUENZA 2 PCR: NOT DETECTED
PARAINFLUENZA 3 PCR: NOT DETECTED
PARAINFLUENZA 4 PCR: NOT DETECTED
PDW BLD-RTO: 13.5 % (ref 11.7–14.9)
PH, URINE: 5.5 (ref 5–8)
PLATELET # BLD: 162 K/CU MM (ref 140–440)
PMV BLD AUTO: 10.1 FL (ref 7.5–11.1)
POTASSIUM SERPL-SCNC: 4.3 MMOL/L (ref 3.5–5.1)
PROCALCITONIN: 0.52
PROTEIN UA: 30 MG/DL
PROTHROMBIN TIME: 15.7 SECONDS (ref 11.7–14.5)
RBC # BLD: 4.18 M/CU MM (ref 4.6–6.2)
RBC URINE: 3 /HPF (ref 0–3)
RHINOVIRUS ENTEROVIRUS PCR: NOT DETECTED
RSV PCR: NOT DETECTED
SARS-COV-2, NAAT: NOT DETECTED
SARS-COV-2: NOT DETECTED
SEGMENTED NEUTROPHILS ABSOLUTE COUNT: 4.5 K/CU MM
SEGMENTED NEUTROPHILS RELATIVE PERCENT: 88.7 % (ref 36–66)
SODIUM BLD-SCNC: 136 MMOL/L (ref 135–145)
SOURCE: NORMAL
SPECIFIC GRAVITY UA: >1.03 (ref 1–1.03)
SQUAMOUS EPITHELIAL: 2 /HPF
STREP PNEUMONIAE ANTIGEN: NORMAL
TOTAL IMMATURE NEUTOROPHIL: 0.01 K/CU MM
TOTAL NUCLEATED RBC: 0 K/CU MM
TRICHOMONAS: ABNORMAL /HPF
TROPONIN T: <0.01 NG/ML
TSH HIGH SENSITIVITY: 1.12 UIU/ML (ref 0.27–4.2)
UROBILINOGEN, URINE: 1 MG/DL (ref 0.2–1)
VITAMIN B-12: 1255 PG/ML (ref 211–911)
WBC # BLD: 5.1 K/CU MM (ref 4–10.5)
WBC UA: 10 /HPF (ref 0–2)

## 2023-01-28 PROCEDURE — 6360000004 HC RX CONTRAST MEDICATION: Performed by: EMERGENCY MEDICINE

## 2023-01-28 PROCEDURE — 87186 SC STD MICRODIL/AGAR DIL: CPT

## 2023-01-28 PROCEDURE — 71275 CT ANGIOGRAPHY CHEST: CPT

## 2023-01-28 PROCEDURE — 2700000000 HC OXYGEN THERAPY PER DAY

## 2023-01-28 PROCEDURE — 83036 HEMOGLOBIN GLYCOSYLATED A1C: CPT

## 2023-01-28 PROCEDURE — 87389 HIV-1 AG W/HIV-1&-2 AB AG IA: CPT

## 2023-01-28 PROCEDURE — 80074 ACUTE HEPATITIS PANEL: CPT

## 2023-01-28 PROCEDURE — 82607 VITAMIN B-12: CPT

## 2023-01-28 PROCEDURE — 85610 PROTHROMBIN TIME: CPT

## 2023-01-28 PROCEDURE — 6360000002 HC RX W HCPCS: Performed by: EMERGENCY MEDICINE

## 2023-01-28 PROCEDURE — 87077 CULTURE AEROBIC IDENTIFY: CPT

## 2023-01-28 PROCEDURE — 2580000003 HC RX 258: Performed by: STUDENT IN AN ORGANIZED HEALTH CARE EDUCATION/TRAINING PROGRAM

## 2023-01-28 PROCEDURE — 2060000000 HC ICU INTERMEDIATE R&B

## 2023-01-28 PROCEDURE — 87449 NOS EACH ORGANISM AG IA: CPT

## 2023-01-28 PROCEDURE — 84484 ASSAY OF TROPONIN QUANT: CPT

## 2023-01-28 PROCEDURE — 87899 AGENT NOS ASSAY W/OPTIC: CPT

## 2023-01-28 PROCEDURE — 84443 ASSAY THYROID STIM HORMONE: CPT

## 2023-01-28 PROCEDURE — 86140 C-REACTIVE PROTEIN: CPT

## 2023-01-28 PROCEDURE — 87070 CULTURE OTHR SPECIMN AEROBIC: CPT

## 2023-01-28 PROCEDURE — 96365 THER/PROPH/DIAG IV INF INIT: CPT

## 2023-01-28 PROCEDURE — 36415 COLL VENOUS BLD VENIPUNCTURE: CPT

## 2023-01-28 PROCEDURE — 82746 ASSAY OF FOLIC ACID SERUM: CPT

## 2023-01-28 PROCEDURE — 81003 URINALYSIS AUTO W/O SCOPE: CPT

## 2023-01-28 PROCEDURE — 94761 N-INVAS EAR/PLS OXIMETRY MLT: CPT

## 2023-01-28 PROCEDURE — 85025 COMPLETE CBC W/AUTO DIFF WBC: CPT

## 2023-01-28 PROCEDURE — 2580000003 HC RX 258: Performed by: EMERGENCY MEDICINE

## 2023-01-28 PROCEDURE — 87205 SMEAR GRAM STAIN: CPT

## 2023-01-28 PROCEDURE — 83605 ASSAY OF LACTIC ACID: CPT

## 2023-01-28 PROCEDURE — 84145 PROCALCITONIN (PCT): CPT

## 2023-01-28 PROCEDURE — 87040 BLOOD CULTURE FOR BACTERIA: CPT

## 2023-01-28 PROCEDURE — 0202U NFCT DS 22 TRGT SARS-COV-2: CPT

## 2023-01-28 PROCEDURE — 6360000002 HC RX W HCPCS: Performed by: STUDENT IN AN ORGANIZED HEALTH CARE EDUCATION/TRAINING PROGRAM

## 2023-01-28 PROCEDURE — 87086 URINE CULTURE/COLONY COUNT: CPT

## 2023-01-28 PROCEDURE — 80048 BASIC METABOLIC PNL TOTAL CA: CPT

## 2023-01-28 RX ORDER — ENOXAPARIN SODIUM 100 MG/ML
30 INJECTION SUBCUTANEOUS 2 TIMES DAILY
Status: DISCONTINUED | OUTPATIENT
Start: 2023-01-29 | End: 2023-02-03 | Stop reason: HOSPADM

## 2023-01-28 RX ORDER — POLYETHYLENE GLYCOL 3350 17 G/17G
17 POWDER, FOR SOLUTION ORAL DAILY PRN
Status: DISCONTINUED | OUTPATIENT
Start: 2023-01-28 | End: 2023-02-03 | Stop reason: HOSPADM

## 2023-01-28 RX ORDER — SODIUM CHLORIDE 0.9 % (FLUSH) 0.9 %
5-40 SYRINGE (ML) INJECTION 2 TIMES DAILY
Status: DISCONTINUED | OUTPATIENT
Start: 2023-01-28 | End: 2023-01-28

## 2023-01-28 RX ORDER — SODIUM CHLORIDE 0.9 % (FLUSH) 0.9 %
5-40 SYRINGE (ML) INJECTION PRN
Status: DISCONTINUED | OUTPATIENT
Start: 2023-01-28 | End: 2023-02-03 | Stop reason: HOSPADM

## 2023-01-28 RX ORDER — GUAIFENESIN/DEXTROMETHORPHAN 100-10MG/5
5 SYRUP ORAL EVERY 4 HOURS PRN
Status: DISCONTINUED | OUTPATIENT
Start: 2023-01-28 | End: 2023-02-03 | Stop reason: HOSPADM

## 2023-01-28 RX ORDER — SODIUM CHLORIDE 9 MG/ML
INJECTION, SOLUTION INTRAVENOUS PRN
Status: DISCONTINUED | OUTPATIENT
Start: 2023-01-28 | End: 2023-02-03 | Stop reason: HOSPADM

## 2023-01-28 RX ORDER — IPRATROPIUM BROMIDE AND ALBUTEROL SULFATE 2.5; .5 MG/3ML; MG/3ML
1 SOLUTION RESPIRATORY (INHALATION) EVERY 4 HOURS PRN
Status: DISCONTINUED | OUTPATIENT
Start: 2023-01-28 | End: 2023-02-03 | Stop reason: HOSPADM

## 2023-01-28 RX ORDER — NICOTINE 21 MG/24HR
1 PATCH, TRANSDERMAL 24 HOURS TRANSDERMAL DAILY
Status: DISCONTINUED | OUTPATIENT
Start: 2023-01-28 | End: 2023-02-03 | Stop reason: HOSPADM

## 2023-01-28 RX ORDER — SODIUM CHLORIDE 0.9 % (FLUSH) 0.9 %
5-40 SYRINGE (ML) INJECTION EVERY 12 HOURS SCHEDULED
Status: DISCONTINUED | OUTPATIENT
Start: 2023-01-28 | End: 2023-02-03 | Stop reason: HOSPADM

## 2023-01-28 RX ORDER — SODIUM CHLORIDE, SODIUM LACTATE, POTASSIUM CHLORIDE, CALCIUM CHLORIDE 600; 310; 30; 20 MG/100ML; MG/100ML; MG/100ML; MG/100ML
INJECTION, SOLUTION INTRAVENOUS CONTINUOUS
Status: ACTIVE | OUTPATIENT
Start: 2023-01-28 | End: 2023-01-28

## 2023-01-28 RX ADMIN — AZITHROMYCIN MONOHYDRATE 500 MG: 500 INJECTION, POWDER, LYOPHILIZED, FOR SOLUTION INTRAVENOUS at 01:02

## 2023-01-28 RX ADMIN — Medication 10 ML: at 08:45

## 2023-01-28 RX ADMIN — CEFTRIAXONE SODIUM 2000 MG: 2 INJECTION, POWDER, FOR SOLUTION INTRAMUSCULAR; INTRAVENOUS at 06:10

## 2023-01-28 RX ADMIN — Medication 10 ML: at 21:32

## 2023-01-28 RX ADMIN — SODIUM CHLORIDE, PRESERVATIVE FREE 10 ML: 5 INJECTION INTRAVENOUS at 00:35

## 2023-01-28 RX ADMIN — IOPAMIDOL 75 ML: 755 INJECTION, SOLUTION INTRAVENOUS at 00:29

## 2023-01-28 ASSESSMENT — ENCOUNTER SYMPTOMS
EYES NEGATIVE: 1
ALLERGIC/IMMUNOLOGIC NEGATIVE: 1
COUGH: 1
GASTROINTESTINAL NEGATIVE: 1

## 2023-01-28 NOTE — H&P
History and Physical      Name:  Corina Subramanian /Age/Sex: 1960  (60 y.o. male)   MRN & CSN:  [id number] & [id number] Encounter Date/Time: 2023 2:48 AM EST   Location:  ED15/ED-15 PCP: No primary care provider on file. Hospital Day: 2    Assessment and Plan:     Patient is a 77-year-old male who presented with SOB x2 days. # Sepsis and acute hypoxemic respiratory failure secondary multifocal community-acquired pneumonia  - Presented with SOB and productive cough of green sputum x2 days. No fevers/chills, however, endorsed possible sick contacts.   - Required HFNC in the ED. Clinical evidence of volume depletion. Afebrile, no leukocytosis or AG. LA pending. CXR and CTPE showing multifocal pneumonia (right > left). Flu and COVID negative. RVP ordered. - Started on Azithro in the ED, continue with Rocephin. Limited IVF to prevent volume overload. - Follow-up cultures and inflammatory markers. # Essential hypertension  - Hold home Norvasc 5 mg in setting of sepsis. # Tobacco abuse  - Hx of smoking half PPD for x20 years. - Advised on importance of complete cessation. Checklist:  Advanced directive: full  Antibiotics: as above  Catheter: none  DVT ppx: Lovenox  Nutrition/Diet: cardiac    Disposition: patient requires continued admission due to CAP. MDM: high. History of Present Illness:     Chief Complaint: shortness of breath    Patient is a 77-year-old male with a PMHx of HTN who presented to the ED with SOB and productive cough of green sputum x2 days. No fevers/chills, however, endorsed possible sick contacts. Denied any CP, orthopnea, PND, LE edema, presyncope, N/V, abdominal pain, C/D, urinary changes or bleeding. Smokes about half PPD for over x20 years, but denied any alcohol or illicit drug use. ROS:     Ten point ROS reviewed negative, unless as noted above.     Objective:     No intake or output data in the 24 hours ending 23 0248     Vitals:   Vitals: 01/28/23 0038 01/28/23 0152 01/28/23 0222 01/28/23 0233   BP: 117/75 121/78 (!) 108/53 124/60   Pulse:  58 52 51   Resp:  19 17 16   Temp:   98 °F (36.7 °C)    TempSrc:       SpO2:  99% 98% 100%   Weight:       Height:         BMI: Body mass index is 29.37 kg/m². General: Awake. WDWN. AAOx3. HEENT: PERRLA. Vision grossly intact. Hearing grossly intact. Oropharynx clear. Dry MM. Neck: Supple. No JVD. CV: RRR. NL S1/S2. No murmurs. CR <2 secs. No peripheral edema. Pulm: Increased effort on HFNC. CTAB. CW NTTP. GI: +BS x4. Soft. NT/ND. : No CVA or suprapubic tenderness. No Hernandez catheter. Skin: Intact. Normal coloration, warm, dry. MSK: No gross joint deformities. Full ROM. Muscle strength is 5/5 B/L. Neuro: CNs grossly intact. Normal speech. No focal deficit. Psych: Good judgement and reason. Past History: PMHx:   Past Medical History:   Diagnosis Date    Bilateral hand pain     Pneumonia 01/07/2018    treated in ED    Rheumatoid arthritis ([facility] [address]     Smoker        PSHx: No past surgical history on file. Allergies: Allergies   Allergen Reactions    Pcn [Penicillins]        FHx: family history includes Allergies in his mother; Asthma in his father; Cancer in his mother; Diabetes in his brother, mother, and sister; Heart Failure in his mother; High Blood Pressure in his sister; High Cholesterol in his mother; Hypertension in his brother and mother; Mental Illness in his brother; Migraines in his mother; No Known Problems in his sister, sister, and sister; Osteoarthritis in his father; Rheum Arthritis in his father.     SHx:   Social History     Socioeconomic History    Marital status:      Spouse name: Yasir Shin    Number of children: 3    Years of education: 15   Occupational History    Occupation: [employer]   Tobacco Use    Smoking status: Every Day     Packs/day: 0.50     Types: Cigarettes    Smokeless tobacco: Never   Substance and Sexual Activity    Alcohol use: Yes     Comment: drinks occasionaly     Drug use: No    Sexual activity: Yes     Partners: Female   Social History Narrative    NO family history of violence    YES does feel safe in the home    NO gun in the home       Medications Prior to Admission     Prior to Admission medications    Not on File       Data:     CBC:   Recent Labs     01/27/23 2311   WBC 7.7   HGB 13.4*      MCV 94.0   RDW 13.6   LYMPHOPCT 13.1*   MONOPCT 4.1*   BASOPCT 0.5   MONOSABS 0.3   LYMPHSABS 1.0   EOSABS 0.0   BASOSABS 0.0     CMP:    Recent Labs     01/27/23 2311   *   K 4.1      CO2 21   BUN 17   CREATININE 1.2   GLUCOSE 106*   LABALBU 3.8   CALCIUM 8.4   BILITOT 1.0   ALKPHOS 78   AST 27   ALT 13     Lipids:   Lab Results   Component Value Date/Time    CHOL 146 01/18/2018 10:42 AM    HDL 35 01/18/2018 10:42 AM    TRIG 146 01/18/2018 10:42 AM     Hemoglobin A1C: No results found for: LABA1C  TSH: No results found for: TSH  Troponin:   Lab Results   Component Value Date/Time    TROPONINT <0.010 01/27/2023 11:11 PM     BNP:   Recent Labs     01/27/23 2311   PROBNP 1,901*     Lactic Acid: No results for input(s): LACTA in the last 72 hours. UA:No results found for: [place] Lie, PHUR, LABCAST, WBCUA, RBCUA, MUCUS, TRICHOMONAS, YEAST, BACTERIA, CLARITYU, SPECGRAV, LEUKOCYTESUR, UROBILINOGEN, BILIRUBINUR, BLOODU, GLUCOSEU, KETUA, AMORPHOUS  Urine Cultures: No results found for: LABURIN  Blood Cultures: No results found for: BC  No results found for: BLOODCULT2  Organism: No results found for: [facility]    Radiology results:  CTA PULMONARY W CONTRAST   Preliminary Result   1. Motion limited exam.   2. No acute cardiopulmonary disease. 3. Multifocal pneumonia, right worse than left. XR CHEST PORTABLE   Final Result   1. Multifocal bilateral heterogeneous opacities are more confluent in the   right upper lobe. Differential considerations include multifocal pneumonia   and asymmetric pulmonary edema.   Follow-up PA and lateral chest radiographs 6   weeks after the onset of appropriate medical therapy may be helpful to   document improvement. 2.  Borderline cardiomegaly.              Medications:     Medications:    sodium chloride flush  5-40 mL IntraVENous BID    sodium chloride flush  5-40 mL IntraVENous 2 times per day    [START ON 1/29/2023] enoxaparin  30 mg SubCUTAneous BID    cefTRIAXone (ROCEPHIN) IV  2,000 mg IntraVENous Q24H    [START ON 1/29/2023] azithromycin  250 mg IntraVENous Q24H        Infusions:    sodium chloride      lactated ringers IV soln         PRN Meds:   sodium chloride flush, 5-40 mL, PRN  sodium chloride, , PRN  polyethylene glycol, 17 g, Daily PRN  ipratropium-albuterol, 1 ampule, Q4H PRN  guaiFENesin-dextromethorphan, 5 mL, Q4H PRN  ondansetron, 4 mg, Q30 Min PRN  morphine, 4 mg, Q30 Min PRN        Cory Ornelas MD  01/28/23 2:48 AM

## 2023-01-28 NOTE — PROGRESS NOTES
H&P from 1/28/2023 for further details. 74-year-old male with past medical history of tobacco abuse and no other active home medications presented with progressively worsening shortness of breath. Diagnostic evaluation revealed multifocal/atypical pneumonia secondary to human metapneumovirus. Given the mildly elevated procalcitonin and excessive greenish colored sputum production will cover for bacterial pneumonia as well. Utilize supplemental oxygen to target saturation 88-92%.

## 2023-01-28 NOTE — ED NOTES
ED TO INPATIENT SBAR HANDOFF    Patient Name: Fransico Buckley   :  1960  60 y.o. MRN:  [id number]  Preferred Name  [place]  ED Room #:  ED15/ED-15  Family/Caregiver Present no   Restraints no   Sitter no   Sepsis Risk Score Sepsis Risk Score: 0.83    Situation  Code Status: No Order No additional code details. Allergies: Pcn [penicillins]  Weight:   Patient Vitals for the past 96 hrs (Last 3 readings):   Weight   23 2343 235 lb (106.6 kg)     Arrived from: home  Chief Complaint: No chief complaint on file. Hospital Problem/Diagnosis:  Principal Problem:    CAP (community acquired pneumonia) due to Chlamydia species  Resolved Problems:    * No resolved hospital problems. *    Imaging:   CTA PULMONARY W CONTRAST   Preliminary Result   1. Motion limited exam.   2. No acute cardiopulmonary disease. 3. Multifocal pneumonia, right worse than left. XR CHEST PORTABLE   Final Result   1. Multifocal bilateral heterogeneous opacities are more confluent in the   right upper lobe. Differential considerations include multifocal pneumonia   and asymmetric pulmonary edema. Follow-up PA and lateral chest radiographs 6   weeks after the onset of appropriate medical therapy may be helpful to   document improvement. 2.  Borderline cardiomegaly.            Abnormal labs:   Abnormal Labs Reviewed   CBC WITH AUTO DIFFERENTIAL - Abnormal; Notable for the following components:       Result Value    RBC 4.36 (*)     Hemoglobin 13.4 (*)     Hematocrit 41.0 (*)     Segs Relative 82.3 (*)     Lymphocytes % 13.1 (*)     Monocytes % 4.1 (*)     All other components within normal limits   COMPREHENSIVE METABOLIC PANEL - Abnormal; Notable for the following components:    Sodium 134 (*)     Glucose 106 (*)     All other components within normal limits   BRAIN NATRIURETIC PEPTIDE - Abnormal; Notable for the following components:    Pro-BNP 1,901 (*)     All other components within normal limits   BLOOD GAS, VENOUS - Abnormal; Notable for the following components:    pO2, Srinath 65 (*)     O2 Sat, Srinath 88.6 (*)     All other components within normal limits     Critical values: yes     Abnormal Assessment Findings: see labs    Background  History:   Past Medical History:   Diagnosis Date    Bilateral hand pain     Pneumonia 01/07/2018    treated in ED    Rheumatoid arthritis ([facility] [address]     Smoker        Assessment    Vitals/MEWS: MEWS Score: 2  Level of Consciousness: Alert (0)   Vitals:    01/28/23 0038 01/28/23 0152 01/28/23 0222 01/28/23 0233   BP: 117/75 121/78 (!) 108/53 124/60   Pulse:  58 52 51   Resp:  19 17 16   Temp:   98 °F (36.7 °C)    TempSrc:       SpO2:  99% 98% 100%   Weight:       Height:         FiO2 (%): high humidity nasal cannula  O2 Flow Rate: O2 Device: Heated high flow cannula O2 Flow Rate (L/min): 30 L/min  Cardiac Rhythm:   Pain Assessment: 5 [x] Verbal [] Ezella Brenner Scale  Pain Scale: Pain Assessment  Pain Assessment: 0-10  Pain Level: 5  Pain Location: Chest  Last documented pain score (0-10 scale) Pain Level: 5  Last documented pain medication administered: 2307  Mental Status: oriented, alert, coherent, logical, thought processes intact, and able to concentrate and follow conversation  NIH Score: NIH     C-SSRS: Risk of Suicide: No Risk  Bedside swallow:    Demetrius Coma Scale (GCS): Active LDA's:   Peripheral IV 01/27/23 Left Antecubital (Active)     PO Status: Nothing by Mouth  Pertinent or High Risk Medications/Drips: no   o If Yes, please provide details:   Pending Blood Product Administration: no     You may also review the ED PT Care Timeline found under the Summary Nursing Index tab. Recommendation    Pending orders   Plan for Discharge (if known):    Additional Comments:    If any further questions, please call Sending RN at [phone]      Electronically signed by: Electronically signed by Johnson Marques RN on 1/28/2023 at 2:42 AM       Johnson Marques RN  01/27/23 [facility] RN  01/28/23 0157       Beulah Thurmanet, LATASHA  01/28/23 [id number]

## 2023-01-28 NOTE — ED PROVIDER NOTES
[facility]      TRIAGE CHIEF COMPLAINT:   No chief complaint on file. [place]:  Diana Molina is a 60 y.o. male that presents with complaint of shortness of breath cough chest pain symptoms for the last 2 to 4 days. He arrives by EMS he was hypoxic is requiring oxygen he does not wear home O2. History of smoking. Denies any heart issues or other lung issues no history of DVT, PE, AAA denies any travel sick contacts no abdominal pain no nausea vomiting no fever. No other questions or concerns. Kathryn Navarrete REVIEW OF SYSTEMS:  At least 10 systems reviewed and otherwise acutely negative except as in the [address] Review of Systems   Constitutional:  Positive for activity change and fatigue. HENT:  Positive for congestion. Eyes: Negative. Respiratory:  Positive for cough and shortness of breath. Cardiovascular:  Positive for chest pain. Gastrointestinal: Negative. Endocrine: Negative. Genitourinary: Negative. Musculoskeletal: Negative. Skin: Negative. Allergic/Immunologic: Negative. Neurological: Negative. Hematological: Negative. Psychiatric/Behavioral: Negative. All other systems reviewed and are negative. Past Medical History:   Diagnosis Date    Bilateral hand pain     Pneumonia 01/07/2018    treated in ED    Rheumatoid arthritis ([facility] [address]     Smoker      No past surgical history on file.   Family History   Problem Relation Age of Onset    Cancer Mother     Diabetes Mother     Allergies Mother     High Cholesterol Mother     Heart Failure Mother     Migraines Mother     Hypertension Mother     Diabetes Sister     High Blood Pressure Sister     Osteoarthritis Father     Rheum Arthritis Father     Asthma Father     Mental Illness Brother         anger management    Hypertension Brother     Diabetes Brother     No Known Problems Sister     No Known Problems Sister     No Known Problems Sister      Social History     Socioeconomic History    Marital status:      Spouse name: Dario Singletary    Number of children: 3    Years of education: 12    Highest education level: Not on file   Occupational History    Occupation: [employer]   Tobacco Use    Smoking status: Every Day     Packs/day: 0.50     Types: Cigarettes    Smokeless tobacco: Never   Substance and Sexual Activity    Alcohol use: Yes     Comment: drinks occasionaly     Drug use: No    Sexual activity: Yes     Partners: Female   Other Topics Concern    Not on file   Social History Narrative    NO family history of violence    YES does feel safe in the home    NO gun in the home     Social Determinants of Health     Financial Resource Strain: Not on file   Food Insecurity: Not on file   Transportation Needs: Not on file   Physical Activity: Not on file   Stress: Not on file   Social Connections: Not on file   Intimate Partner Violence: Not on file   Housing Stability: Not on file     Current Facility-Administered Medications   Medication Dose Route Frequency Provider Last Rate Last Admin    sodium chloride flush 0.9 % injection 5-40 mL  5-40 mL IntraVENous BID Mendel Freed, DO   10 mL at 01/28/23 0035    ondansetron (ZOFRAN) injection 4 mg  4 mg IntraVENous Q30 Min PRN Mendel Freed, DO   4 mg at 01/27/23 2306    morphine injection 4 mg  4 mg IntraVENous Q30 Min PRN Mendel Freed, DO         Current Outpatient Medications   Medication Sig Dispense Refill    naproxen (NAPROSYN) 500 MG tablet TAKE 1 TABLET BY MOUTH 2 TIMES DAILY AS NEEDED FOR PAIN TAKE WITH FOOD 30 tablet 2    oseltamivir (TAMIFLU) 75 MG capsule Take 75 mg by mouth 2 times daily      azithromycin (ZITHROMAX) 250 MG tablet Take 250 mg by mouth daily        Allergies   Allergen Reactions    Pcn [Penicillins]      Current Facility-Administered Medications   Medication Dose Route Frequency Provider Last Rate Last Admin    sodium chloride flush 0.9 % injection 5-40 mL  5-40 mL IntraVENous BID Keron Richard, DO   10 mL at 01/28/23 0035    ondansetron [facility]) injection 4 mg  4 mg IntraVENous Q30 Min PRN [employer], DO   4 mg at 01/27/23 2306    morphine injection 4 mg  4 mg IntraVENous Q30 Min PRN [employer], DO         Current Outpatient Medications   Medication Sig Dispense Refill    naproxen (NAPROSYN) 500 MG tablet TAKE 1 TABLET BY MOUTH 2 TIMES DAILY AS NEEDED FOR PAIN TAKE WITH FOOD 30 tablet 2    oseltamivir (TAMIFLU) 75 MG capsule Take 75 mg by mouth 2 times daily      azithromycin (ZITHROMAX) 250 MG tablet Take 250 mg by mouth daily         Nursing Notes Reviewed    VITAL SIGNS:  ED Triage Vitals   Enc Vitals Group      BP       Pulse       Resp       Temp       Temp src       SpO2       Weight       Height       Head Circumference       Peak Flow       Pain Score       Pain Loc       Pain Edu? Excl. in [address]? PHYSICAL EXAM:  Physical Exam  Constitutional:       General: He is in acute distress. Appearance: Normal appearance. He is well-developed and well-groomed. He is ill-appearing. He is not toxic-appearing or diaphoretic. Interventions: Nasal cannula and face mask in place. HENT:      Head: Normocephalic and atraumatic. Right Ear: External ear normal.      Left Ear: External ear normal.      Nose: Congestion present. Eyes:      General: No scleral icterus. Right eye: No discharge. Left eye: No discharge. Extraocular Movements: Extraocular movements intact. Conjunctiva/sclera: Conjunctivae normal.   Cardiovascular:      Rate and Rhythm: Regular rhythm. Tachycardia present. Pulses: Normal pulses. Heart sounds: Normal heart sounds. No murmur heard. No friction rub. No gallop. Pulmonary:      Effort: Respiratory distress present. Breath sounds: No stridor. Wheezing, rhonchi and rales present. Abdominal:      General: Bowel sounds are normal. There is no distension. Palpations: Abdomen is soft. There is no mass. Tenderness: There is no abdominal tenderness. There is no guarding or rebound. Negative signs include Engle's sign, Rovsing's sign and McBurney's sign. Hernia: No hernia is present. Musculoskeletal:         General: Swelling present. No tenderness, deformity or signs of injury. Normal range of motion. Cervical back: Normal range of motion. No rigidity. Right lower leg: No edema. Left lower leg: No edema. Skin:     General: Skin is warm. Coloration: Skin is not jaundiced or pale. Findings: No bruising, erythema, lesion or rash. Neurological:      General: No focal deficit present. Mental Status: He is alert and oriented to person, place, and time. GCS: GCS eye subscore is 4. GCS verbal subscore is 5. GCS motor subscore is 6. Cranial Nerves: Cranial nerves 2-12 are intact. No cranial nerve deficit, dysarthria or facial asymmetry. Sensory: Sensation is intact. No sensory deficit. Motor: Motor function is intact. No weakness, tremor, atrophy, abnormal muscle tone or seizure activity. Coordination: Coordination normal.   Psychiatric:         Mood and Affect: Mood normal.         Behavior: Behavior normal. Behavior is cooperative. Thought Content:  Thought content normal.         Judgment: Judgment normal.         I have reviewed andinterpreted all of the currently available lab results from this visit (if applicable):    Results for orders placed or performed during the hospital encounter of 01/27/23   COVID-19, Rapid    Specimen: Nasopharyngeal   Result Value Ref Range    Source UNKNOWN     SARS-CoV-2, NAAT NOT DETECTED NOT DETECTED   Rapid Flu Swab    Specimen: Nasopharyngeal   Result Value Ref Range    Rapid Influenza A Ag NEGATIVE NEGATIVE    Rapid Influenza B Ag NEGATIVE NEGATIVE   CBC with Auto Differential   Result Value Ref Range    WBC 7.7 4.0 - 10.5 K/CU MM    RBC 4.36 (L) 4.6 - 6.2 M/CU MM    Hemoglobin 13.4 (L) 13.5 - 18.0 GM/DL    Hematocrit 41.0 (L) 42 - 52 %    MCV 94.0 78 - 100 FL    MCH 30.7 27 - 31 PG    MCHC 32.7 32.0 - 36.0 %    RDW 13.6 11.7 - 14.9 %    Platelets 963 786 - 619 K/CU MM    MPV 9.8 7.5 - 11.1 FL    Differential Type AUTOMATED DIFFERENTIAL     Segs Relative 82.3 (H) 36 - 66 %    Lymphocytes % 13.1 (L) 24 - 44 %    Monocytes % 4.1 (H) 0 - 4 %    Eosinophils % 0.0 0 - 3 %    Basophils % 0.5 0 - 1 %    Segs Absolute 6.4 K/CU MM    Lymphocytes Absolute 1.0 K/CU MM    Monocytes Absolute 0.3 K/CU MM    Eosinophils Absolute 0.0 K/CU MM    Basophils Absolute 0.0 K/CU MM    Nucleated RBC % 0.0 %    Total Nucleated RBC 0.0 K/CU MM    Total Immature Neutrophil 0.00 K/CU MM    Immature Neutrophil % 0.0 0 - 0.43 %   Comprehensive Metabolic Panel   Result Value Ref Range    Sodium 134 (L) 135 - 145 MMOL/L    Potassium 4.1 3.5 - 5.1 MMOL/L    Chloride 101 99 - 110 mMol/L    CO2 21 21 - 32 MMOL/L    BUN 17 6 - 23 MG/DL    Creatinine 1.2 0.9 - 1.3 MG/DL    Est, Glom Filt Rate >60 >60 mL/min/1.73m2    Glucose 106 (H) 70 - 99 MG/DL    Calcium 8.4 8.3 - 10.6 MG/DL    Albumin 3.8 3.4 - 5.0 GM/DL    Total Protein 7.2 6.4 - 8.2 GM/DL    Total Bilirubin 1.0 0.0 - 1.0 MG/DL    ALT 13 10 - 40 U/L    AST 27 15 - 37 IU/L    Alkaline Phosphatase 78 40 - 129 IU/L    Anion Gap 12 4 - 16   Troponin   Result Value Ref Range    Troponin T <0.010 <0.01 NG/ML   Brain Natriuretic Peptide   Result Value Ref Range    Pro-BNP 1,901 (H) <300 PG/ML   Blood Gas, Venous   Result Value Ref Range    pH, Srinath 7.37 7.32 - 7.42    pCO2, Srinath 39 38 - 52 mmHG    pO2, Srinath 65 (H) 28 - 48 mmHG    Base Excess 3 0 - 3.3    HCO3, Venous 22.5 19 - 25 MMOL/L    O2 Sat, Srinath 88.6 (H) 50 - 70 %   Magnesium   Result Value Ref Range    Magnesium 1.9 1.8 - 2.4 mg/dl   EKG 12 Lead   Result Value Ref Range    Ventricular Rate 72 BPM    Atrial Rate 72 BPM    QRS Duration 90 ms    Q-T Interval 482 ms    QTc Calculation (Bazett) 527 ms    R Axis 53 degrees    T Axis 86 degrees    Diagnosis       Accelerated Junctional rhythm  Prolonged QT  Abnormal ECG  No previous ECGs available          Radiographs (if obtained):  [] The following radiograph was interpreted by myself in the absence of a radiologist:  [x] Radiologist's Report Reviewed:    CXR CT PE    EKG (if obtained): (All EKG's are interpreted by myself in the absence of a cardiologist)    12 lead EKG per my interpretation:  Normal Sinus Rhythm 72  Axis is   Normal  QTc is  527  There is no specific T wave changes appreciated. There is no specific ST wave changes appreciated. Prior EKG to compare with was not available     Total critical care time today provided was at least 30 minutes. This excludes seperately billable procedure. Critical care time provided for reviewing labs, imaging, giving oxygen, consulting medicine, giving breathing treatments, antibiotics for pneumonia, consulting medicine, giving steroids that required close evaluation and/or intervention with concern for patient decompensation. MDM:    Patient with complaint of cough chest pain shortness of breath again symptoms for the past 2 to 4 days. Came by EMS he was hypoxic at home he is requiring oxygen does not wear home O2. Denies any history of heart or lung issues. History of smoking. He denies any fevers nausea vomiting diarrhea abdominal pain swelling travel sick contacts. On arrival he was hypoxic on 8 L nasal cannula he is around 88% he was put on nonrebreather and nasal cannula I did order labs, imaging he has some slight peripheral edema, swelling. X-ray shows multifocal pneumonia pulmonary edema I gave him nitro for hypertension, chest pain and aspirin his blood pressure did improve, pain did improve again he is requiring oxygen he was given breathing treatment, steroids for possible COPD exacerbation. I did do a PE study. We will get cultures rapid flu, COVID are negative. Patient will be admitted for pneumonia versus CHF versus COPD.   BNP is elevated x-ray came back, CT scan came back as multifocal pneumonia. Patient is requiring oxygen otherwise doing well he was given azithromycin here he has allergies to penicillins we will admit to hospital medicine for multifocal pneumonia, hypoxia otherwise stable. CLINICAL IMPRESSION:  Final diagnoses:   Dyspnea and respiratory abnormalities   Hypoxia   Chest pain, unspecified type   Multifocal pneumonia       (Please note that portions of this note may have been completed with a voice recognition program. Efforts were made to edit the dictations but occasionally words aremis-transcribed.)    DISPOSITION REFERRAL (if applicable):  No follow-up provider specified.     DISPOSITION MEDICATIONS (if applicable):  New Prescriptions    No medications on file          Rock Campos, [facility], DO  01/28/23 0236

## 2023-01-29 LAB
CULTURE: NORMAL
Lab: NORMAL
SPECIMEN: NORMAL

## 2023-01-29 PROCEDURE — 6370000000 HC RX 637 (ALT 250 FOR IP): Performed by: STUDENT IN AN ORGANIZED HEALTH CARE EDUCATION/TRAINING PROGRAM

## 2023-01-29 PROCEDURE — 6360000002 HC RX W HCPCS: Performed by: STUDENT IN AN ORGANIZED HEALTH CARE EDUCATION/TRAINING PROGRAM

## 2023-01-29 PROCEDURE — 2060000000 HC ICU INTERMEDIATE R&B

## 2023-01-29 PROCEDURE — 94761 N-INVAS EAR/PLS OXIMETRY MLT: CPT

## 2023-01-29 PROCEDURE — 2580000003 HC RX 258: Performed by: STUDENT IN AN ORGANIZED HEALTH CARE EDUCATION/TRAINING PROGRAM

## 2023-01-29 PROCEDURE — 2700000000 HC OXYGEN THERAPY PER DAY

## 2023-01-29 RX ORDER — AMLODIPINE BESYLATE 5 MG/1
5 TABLET ORAL DAILY
Status: ON HOLD | COMMUNITY
End: 2023-02-03 | Stop reason: HOSPADM

## 2023-01-29 RX ORDER — FLUTICASONE PROPIONATE 50 MCG
1 SPRAY, SUSPENSION (ML) NASAL DAILY
Status: DISCONTINUED | OUTPATIENT
Start: 2023-01-29 | End: 2023-02-03 | Stop reason: HOSPADM

## 2023-01-29 RX ADMIN — Medication 10 ML: at 09:27

## 2023-01-29 RX ADMIN — Medication 10 ML: at 22:00

## 2023-01-29 RX ADMIN — ENOXAPARIN SODIUM 30 MG: 100 INJECTION SUBCUTANEOUS at 09:26

## 2023-01-29 RX ADMIN — CEFTRIAXONE SODIUM 2000 MG: 2 INJECTION, POWDER, FOR SOLUTION INTRAMUSCULAR; INTRAVENOUS at 04:55

## 2023-01-29 RX ADMIN — SODIUM CHLORIDE 250 MG: 9 INJECTION, SOLUTION INTRAVENOUS at 03:11

## 2023-01-29 RX ADMIN — Medication 20 ML: at 09:27

## 2023-01-29 RX ADMIN — GUAIFENESIN AND DEXTROMETHORPHAN 5 ML: 100; 10 SYRUP ORAL at 21:59

## 2023-01-29 RX ADMIN — FLUTICASONE PROPIONATE 1 SPRAY: 50 SPRAY, METERED NASAL at 19:09

## 2023-01-29 RX ADMIN — ENOXAPARIN SODIUM 30 MG: 100 INJECTION SUBCUTANEOUS at 21:59

## 2023-01-29 NOTE — PROGRESS NOTES
RRT checked on pt.    Pt's SaO2 is 100% on 30lpm & 80%.    RRT decreased O2 to 70%    Pt is tolerating well

## 2023-01-29 NOTE — PROGRESS NOTES
Dr Becky Wright aware of the 2 degree heart block overnight and that patient is having pauses (longest one was 3.02

## 2023-01-29 NOTE — PROGRESS NOTES
V2.0  [facility] [place]      Name:  Joshua Sherman /Age/Sex: 1960  (60 y.o. male)   MRN & CSN:  [id number] & [id number] Encounter Date/Time: 2023 6:24 PM EST    Location:  -A PCP: No primary care provider on file. Hospital Day: 3    Assessment and Plan:   Joshua Sherman is a 60 y.o. male with no significant past medical history except for tobacco abuse presented with progressively worsening shortness of breath. Sepsis present on admission  Acute hypoxic respiratory failure  Likely secondary to meta pneumovirus with superimposed MSSA pneumonia  CT angiogram of the chest without pulmonary embolism, multiple scattered consolidations concerning for multifocal pneumonia. Sputum culture positive for staph aureus  Discontinue azithromycin, continue Rocephin  Improving oxygen requirements although still requiring NIPPV  Titrate supplemental oxygen to target so2 >92%    Tobacco abuse  Nicotine replacement therapy  Counseled regarding cessation    Diet ADULT DIET; Regular; 5 carb choices (75 gm/meal); Low Fat/Low Chol/High Fiber/SHANNON; Low Sodium (2 gm)   DVT Prophylaxis [x] Lovenox, []  Heparin, [] SCDs, [] Ambulation,  [] Eliquis, [] Xarelto  [] Coumadin   Code Status Full Code   Disposition From: Home  Expected Disposition: Home  Estimated Date of Discharge: 2-3 days  Patient requires continued admission due to Respiratory failure   Surrogate Decision Maker/ POA Spouse     Subjective:     Chief Complaint: SOB       Joshua Sherman is a 60 y.o. male who presents with progressively worsening shortness of breath. Patient was seen and evaluated at bedside earlier this am. Aaox3. Still requiring vapotherm with decreasing Fio2 requirements. Denies active complaints or significant overnight events. Objective:      Intake/Output Summary (Last 24 hours) at 2023 1832  Last data filed at 2023 1718  Gross per 24 hour   Intake 1866.69 ml   Output 1620 ml   Net 246.69 ml        Vitals:   Vitals:    01/29/23 1716   BP: (!) 127/92   Pulse: 68   Resp: 16   Temp: 98.2 °F (36.8 °C)   SpO2: 97%       Physical Exam:   Physical Exam  Vitals reviewed. Constitutional:       Appearance: Normal appearance. He is normal weight. HENT:      Head: Normocephalic and atraumatic. Nose: Nose normal.      Mouth/Throat:      Mouth: Mucous membranes are dry. Pharynx: Oropharynx is clear. Eyes:      General: No scleral icterus. Conjunctiva/sclera: Conjunctivae normal.   Cardiovascular:      Rate and Rhythm: Normal rate and regular rhythm. Pulses: Normal pulses. Heart sounds: Normal heart sounds. No murmur heard. Pulmonary:      Effort: Pulmonary effort is normal.      Breath sounds: Rales present. No wheezing or rhonchi. Abdominal:      General: Bowel sounds are normal. There is no distension. Palpations: Abdomen is soft. Tenderness: There is no abdominal tenderness. Musculoskeletal:         General: No deformity. Normal range of motion. Cervical back: Neck supple. No rigidity. Right lower leg: No edema. Left lower leg: No edema. Skin:     Coloration: Skin is not jaundiced or pale. Neurological:      General: No focal deficit present. Mental Status: He is alert and oriented to person, place, and time. Mental status is at baseline. Medications:   Medications:    fluticasone  1 spray Each Nostril Daily    sodium chloride flush  5-40 mL IntraVENous 2 times per day    enoxaparin  30 mg SubCUTAneous BID    cefTRIAXone (ROCEPHIN) IV  2,000 mg IntraVENous Q24H    nicotine  1 patch TransDERmal Daily      Infusions:    sodium chloride       PRN Meds: sodium chloride flush, 5-40 mL, PRN  sodium chloride, , PRN  polyethylene glycol, 17 g, Daily PRN  ipratropium-albuterol, 1 ampule, Q4H PRN  guaiFENesin-dextromethorphan, 5 mL, Q4H PRN      Labs    No results found for this or any previous visit (from the past 24 hour(s)). Imaging/Diagnostics Last 24 Hours   XR CHEST PORTABLE    Result Date: 1/27/2023  EXAMINATION: ONE XRAY VIEW OF THE CHEST 1/27/2023 10:57 pm COMPARISON: 01/07/2018. HISTORY: ORDERING SYSTEM PROVIDED HISTORY: dyspnea TECHNOLOGIST PROVIDED HISTORY: Reason for exam:->dyspnea Reason for Exam: dyspnea FINDINGS: Frontal portable view of the chest.  Normal lung volume. Multifocal bilateral heterogeneous opacities are more confluent in the right upper lobe. Normal pulmonary vasculature. No pleural effusion or pneumothorax. Borderline cardiomegaly. No acute osseous abnormality. 1.  Multifocal bilateral heterogeneous opacities are more confluent in the right upper lobe. Differential considerations include multifocal pneumonia and asymmetric pulmonary edema. Follow-up PA and lateral chest radiographs 6 weeks after the onset of appropriate medical therapy may be helpful to document improvement. 2.  Borderline cardiomegaly. CTA PULMONARY W CONTRAST    Result Date: 1/29/2023  EXAMINATION: CTA OF THE CHEST 1/28/2023 12:02 am TECHNIQUE: CTA of the chest was performed after the administration of intravenous contrast.  Multiplanar reformatted images are provided for review. MIP images are provided for review. Automated exposure control, iterative reconstruction, and/or weight based adjustment of the mA/kV was utilized to reduce the radiation dose to as low as reasonably achievable. COMPARISON: January 27, 2023 HISTORY: ORDERING SYSTEM PROVIDED HISTORY: dyspnea/hypoxia TECHNOLOGIST PROVIDED HISTORY: Reason for exam:->dyspnea/hypoxia Decision Support Exception - unselect if not a suspected or confirmed emergency medical condition->Emergency Medical Condition (MA) Reason for Exam: dyspnea/hypoxia FINDINGS: Pulmonary Arteries: Pulmonary arteries are adequately opacified for evaluation. No evidence of intraluminal filling defect to suggest pulmonary embolism. Evaluation is severely limited by motion.   Main pulmonary artery is normal in caliber. Mediastinum: The heart is normal in size without a pericardial effusion. The aorta and arch branches are normal in course and caliber. No pathologically enlarged mediastinal or hilar nodes. Lungs/pleura: Severe emphysema. Patchy airspace opacities are identified bilaterally, right greater than left. No pleural effusions. Central airways are patent. Diffuse bronchial wall thickening without bronchiectasis. No dominant or suspicious pulmonary nodules. Upper Abdomen: Small hiatal hernia. Soft Tissues/Bones: No acute bone or soft tissue abnormality. 1. Motion limited exam. 2. No acute cardiopulmonary disease. 3. Multifocal pneumonia, right worse than left.  RECOMMENDATIONS: Unavailable       Electronically signed by Codi Alejandro MD on 1/29/2023 at 6:32 PM

## 2023-01-30 ENCOUNTER — APPOINTMENT (OUTPATIENT)
Dept: GENERAL RADIOLOGY | Age: 63
End: 2023-01-30
Payer: COMMERCIAL

## 2023-01-30 LAB
CULTURE: ABNORMAL
CULTURE: ABNORMAL
GRAM SMEAR: ABNORMAL
HIV SCREEN: NON REACTIVE
LV EF: 60 %
LVEF MODALITY: NORMAL
Lab: ABNORMAL
SPECIMEN: ABNORMAL

## 2023-01-30 PROCEDURE — 93306 TTE W/DOPPLER COMPLETE: CPT

## 2023-01-30 PROCEDURE — 94640 AIRWAY INHALATION TREATMENT: CPT

## 2023-01-30 PROCEDURE — 6370000000 HC RX 637 (ALT 250 FOR IP): Performed by: STUDENT IN AN ORGANIZED HEALTH CARE EDUCATION/TRAINING PROGRAM

## 2023-01-30 PROCEDURE — 2580000003 HC RX 258: Performed by: STUDENT IN AN ORGANIZED HEALTH CARE EDUCATION/TRAINING PROGRAM

## 2023-01-30 PROCEDURE — 6360000002 HC RX W HCPCS: Performed by: STUDENT IN AN ORGANIZED HEALTH CARE EDUCATION/TRAINING PROGRAM

## 2023-01-30 PROCEDURE — 2700000000 HC OXYGEN THERAPY PER DAY

## 2023-01-30 PROCEDURE — 2060000000 HC ICU INTERMEDIATE R&B

## 2023-01-30 PROCEDURE — 99255 IP/OBS CONSLTJ NEW/EST HI 80: CPT | Performed by: INTERNAL MEDICINE

## 2023-01-30 PROCEDURE — APPSS60 APP SPLIT SHARED TIME 46-60 MINUTES: Performed by: NURSE PRACTITIONER

## 2023-01-30 PROCEDURE — 94761 N-INVAS EAR/PLS OXIMETRY MLT: CPT

## 2023-01-30 PROCEDURE — 71045 X-RAY EXAM CHEST 1 VIEW: CPT

## 2023-01-30 RX ORDER — METHYLPREDNISOLONE SODIUM SUCCINATE 40 MG/ML
40 INJECTION, POWDER, LYOPHILIZED, FOR SOLUTION INTRAMUSCULAR; INTRAVENOUS DAILY
Status: DISCONTINUED | OUTPATIENT
Start: 2023-01-30 | End: 2023-01-30

## 2023-01-30 RX ORDER — IPRATROPIUM BROMIDE AND ALBUTEROL SULFATE 2.5; .5 MG/3ML; MG/3ML
1 SOLUTION RESPIRATORY (INHALATION) EVERY 4 HOURS
Status: DISCONTINUED | OUTPATIENT
Start: 2023-01-30 | End: 2023-01-31

## 2023-01-30 RX ORDER — METHYLPREDNISOLONE SODIUM SUCCINATE 40 MG/ML
40 INJECTION, POWDER, LYOPHILIZED, FOR SOLUTION INTRAMUSCULAR; INTRAVENOUS DAILY
Status: DISCONTINUED | OUTPATIENT
Start: 2023-01-30 | End: 2023-02-03

## 2023-01-30 RX ADMIN — Medication 10 ML: at 21:32

## 2023-01-30 RX ADMIN — GUAIFENESIN AND DEXTROMETHORPHAN 5 ML: 100; 10 SYRUP ORAL at 21:31

## 2023-01-30 RX ADMIN — CEFTRIAXONE SODIUM 2000 MG: 2 INJECTION, POWDER, FOR SOLUTION INTRAMUSCULAR; INTRAVENOUS at 05:03

## 2023-01-30 RX ADMIN — METHYLPREDNISOLONE SODIUM SUCCINATE 40 MG: 40 INJECTION, POWDER, FOR SOLUTION INTRAMUSCULAR; INTRAVENOUS at 10:46

## 2023-01-30 RX ADMIN — IPRATROPIUM BROMIDE AND ALBUTEROL SULFATE 1 AMPULE: 2.5; .5 SOLUTION RESPIRATORY (INHALATION) at 20:29

## 2023-01-30 RX ADMIN — IPRATROPIUM BROMIDE AND ALBUTEROL SULFATE 1 AMPULE: 2.5; .5 SOLUTION RESPIRATORY (INHALATION) at 16:01

## 2023-01-30 RX ADMIN — ENOXAPARIN SODIUM 30 MG: 100 INJECTION SUBCUTANEOUS at 09:58

## 2023-01-30 RX ADMIN — Medication 10 ML: at 09:58

## 2023-01-30 RX ADMIN — IPRATROPIUM BROMIDE AND ALBUTEROL SULFATE 1 AMPULE: 2.5; .5 SOLUTION RESPIRATORY (INHALATION) at 11:50

## 2023-01-30 RX ADMIN — ENOXAPARIN SODIUM 30 MG: 100 INJECTION SUBCUTANEOUS at 21:32

## 2023-01-30 RX ADMIN — CEFAZOLIN 2000 MG: 2 INJECTION, POWDER, FOR SOLUTION INTRAMUSCULAR; INTRAVENOUS at 10:03

## 2023-01-30 RX ADMIN — CEFAZOLIN 2000 MG: 2 INJECTION, POWDER, FOR SOLUTION INTRAMUSCULAR; INTRAVENOUS at 17:55

## 2023-01-30 RX ADMIN — FLUTICASONE PROPIONATE 1 SPRAY: 50 SPRAY, METERED NASAL at 09:59

## 2023-01-30 NOTE — CONSULTS
Infectious Disease Consult Note  2023   Patient Name: Estela Duenas : 1960   Impression  Sepsis Secondary to MSSA Multifocal Pneumonia Complicated by Acute Hypoxic Respiratory Failure:  Human [place]-Pneumovirus by PCR:  Afebrile, no leukocytosis  Pct 0.515, .9  -BC 0/2-NGTD  CrCl 77  -Resp culture: MSSA  -Legionella ag, Strep pneumo negative  -Human [place]-pneumovirus Positive by PCR  -Urine culture: NGTD  -MRSA/MSSA screen Pending  -CTA Pulmonary W Contrast: 1. Motion limited exam.   2. No acute cardiopulmonary disease. 3. Multifocal pneumonia, right worse than left. Dr. Stapleton Speaks consulted for pulmonology   RA:  Reports mgt per Dr. Ester Crews with po Naproxen and Tylenol  Reports was on an another oral regimen 2 months ago (unsure of the name)  HTN:  Tobacco Abuse:  Multi-morbidity: per PMHx    Plan:  Continue IV cefazolin 2 gm q8h   Trend CRP and Pct, ordered    Thank you for allowing me to consult in the care of this patient.  ------------------------  REASON FOR CONSULT: Infective syndrome \"Multifocal MSSA pneumonia + human metapneumovirs with respiratory failure on vapotherm. \"  Requested by: Dr. Nicolle Arboleda is a 60 y.o. -American male with a history of HTN, RA, tobacco abuse, and allergy to PCN who was admitted 2023 for further evaluation and management of SOB with a productive cough of green sputum for 2 days prior to admission. His CXR revealed bilateral multi-focal pneumonia. His CTA pulmonary W contrast revealed multifocal pneumonia, right worse than left. He was given a dose of Solu-medrol 125 mg IV. He was started on IV empiric ABX therapy of ceftriaxone and azithromycin and then transitioned to IV cefazolin on 2023. His respiratory panel was positive for Human [place]-Pneumovirus. A respiratory culture grew MSSA.   He developed acute hypoxic respiratory distress and has required oxygen therapy of vapo-therm but his needs are decreasing.    ?  Infectious diseases service was consulted to evaluate the pt, and recommend further investigative and therapeutic measures.  ROS: Other systems reviewed Including eyes, ENT, respiratory, cardiovascular, GI, , dermatologic, neurologic, psych, hem/lymphatic, musculoskeletal and endocrine were negative other than what is mentioned above.     Patient Active Problem List    Diagnosis Date Noted    CAP (community acquired pneumonia) due to Chlamydia species 01/28/2023    Bilateral hand pain 08/21/2017    Tobacco dependency 08/21/2017     Past Medical History:   Diagnosis Date    Bilateral hand pain     Pneumonia 01/07/2018    treated in ED    Rheumatoid arthritis (HCC)     Smoker       No past surgical history on file.   Family History   Problem Relation Age of Onset    Cancer Mother     Diabetes Mother     Allergies Mother     High Cholesterol Mother     Heart Failure Mother     Migraines Mother     Hypertension Mother     Diabetes Sister     High Blood Pressure Sister     Osteoarthritis Father     Rheum Arthritis Father     Asthma Father     Mental Illness Brother         anger management    Hypertension Brother     Diabetes Brother     No Known Problems Sister     No Known Problems Sister     No Known Problems Sister       Infectious disease related family history - not contibutory.   SOCIAL HISTORY  Social History     Tobacco Use    Smoking status: Every Day     Packs/day: 0.50     Types: Cigarettes    Smokeless tobacco: Never   Substance Use Topics    Alcohol use: Yes     Comment: drinks occasionaly       Born: [place]  Lives: [place], [place] with wife  Occupation: [employer] at [employer]   No recent travel of significance.  No recent unusual exposures.  Pet: one dog  ?  ALLERGIES  Allergies   Allergen Reactions    Pcn [Penicillins]       MEDICATIONS  Reviewed and are per the chart/EMR.   ?  Antibiotics:   Present:  Cefazolin 1/30-?  Past:  Azithromycin 1/28-29  Ceftriaxone  1/28-30  -------------------------------------------------------------------------------------------------------------------    Vital Signs:  Vitals:    01/30/23 1303   BP: 136/76   Pulse: 61   Resp: 14   Temp:    SpO2:          Exam:    VS: noted; wt 235 lb (106.6 kg) Height 6'3\"  Gen: alert and oriented X3, no distress  HEMT: AT/NC Oropharynx pink, moist, and without lesions or exudates; dentition in good state of repair  Eyes: PERRLA, EOMI, conjunctiva pink, sclera anicteric. Neck: Supple. Trachea midline. No LAD. Chest: no distress and CTA. Posterior breath sounds diminished bases. Oxygen per Vapotherm. Heart: NSR and no MRG. Abd: soft, non-distended, no tenderness, no hepatomegaly. Normoactive bowel sounds. Ext: no clubbing, cyanosis, or edema  Neuro: Mental status intact. CN 2-12 intact and no focal sensory or motor deficits    ? Diagnostic Studies: reviewed  1/27/2023 XR Chest Portable:  Impression   1. Multifocal bilateral heterogeneous opacities are more confluent in the   right upper lobe. Differential considerations include multifocal pneumonia   and asymmetric pulmonary edema. Follow-up PA and lateral chest radiographs 6   weeks after the onset of appropriate medical therapy may be helpful to   document improvement. 2.  Borderline cardiomegaly. 1/28/2023 CTA Pulmonary W Contrast:  Impression   1. Motion limited exam.   2. No acute cardiopulmonary disease. 3. Multifocal pneumonia, right worse than left. 1/30/2023 XR Chest Portable:  ?? I have examined this patient and available medical records on this date and have made the above observations, conclusions and recommendations. Electronically signed by: Electronically signed by YANIQUE Love CNP on 1/30/2023 at 3:18 PM

## 2023-01-30 NOTE — PROGRESS NOTES
Dr. Barrera Hu paged via [employer] for consult for Multifocal MSSA pneumonia + human metapneumovirs with respiratory failure on vapotherm.

## 2023-01-30 NOTE — CARE COORDINATION
CM spoke with pt to initiate discharge planning. Role of CM explained. Pt has insurance and is able to afford medication. Pt does have a  PCP. Pt is from home with his wife. Pt drives but car is broken. Pt gets transportation from family, friends or [employer]. Pt lives in a home and is independent, steps are not an issue for pt. Plan home no needs. CM contact information given to pt. CM team available as needs arise.

## 2023-01-30 NOTE — DISCHARGE INSTRUCTIONS
Internal Medicine Discharge Instruction    Discharge to:  Home  Diet: Regular   Activity: As tolerated       Be compliant with medications  Please take medications as prescribed   Please follow up with your PCP in 1 week. Please follow up with the lung doctor and with the heart doctor. Please do not smoke marijuana - it can cause your heart rate to go down        Electronically signed by Ty Ramsay MD on 2/3/2023 at 11:20 AM        1. Call to schedule follow up hospital follow up appointment:   [facility] [address]   201 [facility], [address] [phone]   [phone] [address] [phone]   [address] [place] [address] Nw     2.  Call for new patient Primary Care Physician appointment:   Physician Finder [phone]   Dr. Yoel Bess and Dr. Jocelyn Brar [phone]   502 [facility], [address] [place] [address] E [address] [facility] [phone]   [address] [place], [place]   Dr. Sebas Kimble and [place] Cowboy [phone]   3500 [facility], [address] [facility]   Dr. Codi Owens and Alyson OLIVARES [phone]   [facility], [address]   45 [facility], [address] [place] [address] [place] [address] Direct Primary Care [phone]   3651 [facility], [address] [place] [address] *   Dr. Hal OLIVARES and Maggie [facility] [phone]   269 [facility], [address] [facility], [address] [place], [place] [address]Active Waiting List*) [phone]   [address] [place], [place] [address]n [facility]   Dr. Pernell Dawn [phone]   [address], [place]   Sharona Turpin [phone]   [address]   Dr. Sebastián Lindsey [phone]   Dony Bess and Dr. Nadia Lovell [phone]   900 2601 [facility], [address] Ravin OLIVARES and [place] [phone]   1304 [facility], [address] [facility], [facility] and [facility] [phone]    [facility] Ravin Em and Darylene Keeling [phone]   [place], Dr. Tee Marques CNP, Prennathanael Peres CNP and [facility] CNP   [phone]   112 [facility], [address] April Martins CNP and Jin Kern [phone]   [address] [facility]   Dr. Viji Mueller 2200 [facility], [address] [place] [address]

## 2023-01-30 NOTE — PROGRESS NOTES
V2.0  [facility] Hospitalist Progress Note      Name:  Priscila Castañeda /Age/Sex: 1960  (60 y.o. male)   MRN & CSN:  [id number] & [id number] Encounter Date/Time: 2023 6:24 PM EST    Location:  -A PCP: No primary care provider on file. Hospital Day: 4    Assessment and Plan:   Priscila Castañeda is a 60 y.o. male with no significant past medical history except for tobacco abuse presented with progressively worsening shortness of breath. Acute hypoxic respiratory failure  Patient with no history of asthma or COPD. Patient is a current and longtime smoker. Has respiratory failure with a large oxygen requirement. Pulmonology consulted  Continue Vapotherm for supplemental oxygen   Titrate supplemental oxygen to target so2 >92%  I will also place the patient on IV steroids and scheduled duo nebs    Sepsis present on admission  Likely secondary to meta pneumovirus with superimposed MSSA pneumonia  CT angiogram of the chest without pulmonary embolism, multiple scattered consolidations concerning for multifocal pneumonia. Sputum culture positive for staph aureus  Discontinue azithromycin and Rocephin, will place patient on Ancef  Infectious disease consulted  Follow-up blood cultures    Tobacco abuse  Nicotine replacement therapy  Counseled regarding cessation    Diet ADULT DIET; Regular; 5 carb choices (75 gm/meal); Low Fat/Low Chol/High Fiber/SHANNON; Low Sodium (2 gm)   DVT Prophylaxis [x] Lovenox, []  Heparin, [] SCDs, [] Ambulation,  [] Eliquis, [] Xarelto  [] Coumadin   Code Status Full Code   Disposition From: Home  Expected Disposition: Home  Estimated Date of Discharge: 2-5 days  Patient requires continued admission due to Respiratory failure   Surrogate Decision Maker/ POA Spouse     Subjective:     Chief Complaint: SOB     Patient continued on high oxygen requirements and currently on 30 L of Vapotherm. He has known edema metapneumovirus as well as MSSA pneumonia.   At this time, I will bring him pulmonology and infectious disease and placed the patient on scheduled breathing treatments as well as steroids. No documented history of asthma or COPD, but the patient is a smoker      Objective: Intake/Output Summary (Last 24 hours) at 1/30/2023 1502  Last data filed at 1/30/2023 0749  Gross per 24 hour   Intake 290 ml   Output 1525 ml   Net -1235 ml          Vitals:   Vitals:    01/30/23 1303   BP: 136/76   Pulse: 61   Resp: 14   Temp:    SpO2:        Physical Exam:   Physical Exam  Vitals reviewed. Constitutional:       Appearance: Normal appearance. He is normal weight. HENT:      Head: Normocephalic and atraumatic. Nose: Nose normal.      Mouth/Throat:      Mouth: Mucous membranes are dry. Pharynx: Oropharynx is clear. Eyes:      General: No scleral icterus. Conjunctiva/sclera: Conjunctivae normal.   Cardiovascular:      Rate and Rhythm: Normal rate and regular rhythm. Pulses: Normal pulses. Heart sounds: Normal heart sounds. No murmur heard. Pulmonary:      Effort: Pulmonary effort is normal.      Breath sounds: Rales present. No wheezing or rhonchi. Abdominal:      General: Bowel sounds are normal. There is no distension. Palpations: Abdomen is soft. Tenderness: There is no abdominal tenderness. Musculoskeletal:         General: No deformity. Normal range of motion. Cervical back: Neck supple. No rigidity. Right lower leg: No edema. Left lower leg: No edema. Skin:     Coloration: Skin is not jaundiced or pale. Neurological:      General: No focal deficit present. Mental Status: He is alert and oriented to person, place, and time. Mental status is at baseline.           Medications:   Medications:    ceFAZolin  2,000 mg IntraVENous Q8H    ipratropium-albuterol  1 ampule Inhalation Q4H    methylPREDNISolone  40 mg IntraVENous Daily    fluticasone  1 spray Each Nostril Daily    sodium chloride flush  5-40 mL IntraVENous 2 times per day    enoxaparin  30 mg SubCUTAneous BID    nicotine  1 patch TransDERmal Daily      Infusions:    sodium chloride       PRN Meds: sodium chloride flush, 5-40 mL, PRN  sodium chloride, , PRN  polyethylene glycol, 17 g, Daily PRN  ipratropium-albuterol, 1 ampule, Q4H PRN  guaiFENesin-dextromethorphan, 5 mL, Q4H PRN      Labs    No results found for this or any previous visit (from the past 24 hour(s)). Imaging/Diagnostics Last 24 Hours   XR CHEST PORTABLE    Result Date: 1/27/2023  EXAMINATION: ONE XRAY VIEW OF THE CHEST 1/27/2023 10:57 pm COMPARISON: 01/07/2018. HISTORY: ORDERING SYSTEM PROVIDED HISTORY: dyspnea TECHNOLOGIST PROVIDED HISTORY: Reason for exam:->dyspnea Reason for Exam: dyspnea FINDINGS: Frontal portable view of the chest.  Normal lung volume. Multifocal bilateral heterogeneous opacities are more confluent in the right upper lobe. Normal pulmonary vasculature. No pleural effusion or pneumothorax. Borderline cardiomegaly. No acute osseous abnormality. 1.  Multifocal bilateral heterogeneous opacities are more confluent in the right upper lobe. Differential considerations include multifocal pneumonia and asymmetric pulmonary edema. Follow-up PA and lateral chest radiographs 6 weeks after the onset of appropriate medical therapy may be helpful to document improvement. 2.  Borderline cardiomegaly. CTA PULMONARY W CONTRAST    Result Date: 1/29/2023  EXAMINATION: CTA OF THE CHEST 1/28/2023 12:02 am TECHNIQUE: CTA of the chest was performed after the administration of intravenous contrast.  Multiplanar reformatted images are provided for review. MIP images are provided for review. Automated exposure control, iterative reconstruction, and/or weight based adjustment of the mA/kV was utilized to reduce the radiation dose to as low as reasonably achievable.  COMPARISON: January 27, 2023 HISTORY: ORDERING SYSTEM PROVIDED HISTORY: dyspnea/hypoxia TECHNOLOGIST PROVIDED HISTORY: Reason for exam:->dyspnea/hypoxia Decision Support Exception - unselect if not a suspected or confirmed emergency medical condition->Emergency Medical Condition (MA) Reason for Exam: dyspnea/hypoxia FINDINGS: Pulmonary Arteries: Pulmonary arteries are adequately opacified for evaluation. No evidence of intraluminal filling defect to suggest pulmonary embolism. Evaluation is severely limited by motion. Main pulmonary artery is normal in caliber. Mediastinum: The heart is normal in size without a pericardial effusion. The aorta and arch branches are normal in course and caliber. No pathologically enlarged mediastinal or hilar nodes. Lungs/pleura: Severe emphysema. Patchy airspace opacities are identified bilaterally, right greater than left. No pleural effusions. Central airways are patent. Diffuse bronchial wall thickening without bronchiectasis. No dominant or suspicious pulmonary nodules. Upper Abdomen: Small hiatal hernia. Soft Tissues/Bones: No acute bone or soft tissue abnormality. 1. Motion limited exam. 2. No acute cardiopulmonary disease. 3. Multifocal pneumonia, right worse than left.  RECOMMENDATIONS: Unavailable       Electronically signed by Dion Parr MD on 1/30/2023 at 3:02 PM

## 2023-01-31 PROBLEM — J15.211 PNEUMONIA OF BOTH LOWER LOBES DUE TO METHICILLIN SUSCEPTIBLE STAPHYLOCOCCUS AUREUS (MSSA) (HCC): Status: ACTIVE | Noted: 2023-01-31

## 2023-01-31 PROBLEM — J96.01 ACUTE RESPIRATORY FAILURE WITH HYPOXIA (HCC): Status: ACTIVE | Noted: 2023-01-31

## 2023-01-31 PROBLEM — J18.9 MULTIFOCAL PNEUMONIA: Status: ACTIVE | Noted: 2023-01-31

## 2023-01-31 LAB
ANION GAP SERPL CALCULATED.3IONS-SCNC: 11 MMOL/L (ref 4–16)
BASOPHILS ABSOLUTE: 0 K/CU MM
BASOPHILS RELATIVE PERCENT: 0.2 % (ref 0–1)
BUN BLDV-MCNC: 27 MG/DL (ref 6–23)
C-REACTIVE PROTEIN, HIGH SENSITIVITY: 67.4 MG/L
CALCIUM SERPL-MCNC: 8.4 MG/DL (ref 8.3–10.6)
CHLORIDE BLD-SCNC: 106 MMOL/L (ref 99–110)
CO2: 22 MMOL/L (ref 21–32)
CREAT SERPL-MCNC: 1.1 MG/DL (ref 0.9–1.3)
DIFFERENTIAL TYPE: ABNORMAL
EKG ATRIAL RATE: 72 BPM
EKG DIAGNOSIS: NORMAL
EKG Q-T INTERVAL: 482 MS
EKG QRS DURATION: 90 MS
EKG QTC CALCULATION (BAZETT): 527 MS
EKG R AXIS: 53 DEGREES
EKG T AXIS: 86 DEGREES
EKG VENTRICULAR RATE: 72 BPM
EOSINOPHILS ABSOLUTE: 0 K/CU MM
EOSINOPHILS RELATIVE PERCENT: 0 % (ref 0–3)
GFR SERPL CREATININE-BSD FRML MDRD: >60 ML/MIN/1.73M2
GLUCOSE BLD-MCNC: 154 MG/DL (ref 70–99)
HCT VFR BLD CALC: 35.9 % (ref 42–52)
HEMOGLOBIN: 12 GM/DL (ref 13.5–18)
IMMATURE NEUTROPHIL %: 0.4 % (ref 0–0.43)
LYMPHOCYTES ABSOLUTE: 1.1 K/CU MM
LYMPHOCYTES RELATIVE PERCENT: 20.4 % (ref 24–44)
MCH RBC QN AUTO: 30.9 PG (ref 27–31)
MCHC RBC AUTO-ENTMCNC: 33.4 % (ref 32–36)
MCV RBC AUTO: 92.5 FL (ref 78–100)
MONOCYTES ABSOLUTE: 0.3 K/CU MM
MONOCYTES RELATIVE PERCENT: 6.1 % (ref 0–4)
PDW BLD-RTO: 13.3 % (ref 11.7–14.9)
PLATELET # BLD: 185 K/CU MM (ref 140–440)
PMV BLD AUTO: 10.5 FL (ref 7.5–11.1)
POTASSIUM SERPL-SCNC: 4.4 MMOL/L (ref 3.5–5.1)
PROCALCITONIN: 0.16
RBC # BLD: 3.88 M/CU MM (ref 4.6–6.2)
SEGMENTED NEUTROPHILS ABSOLUTE COUNT: 4 K/CU MM
SEGMENTED NEUTROPHILS RELATIVE PERCENT: 72.9 % (ref 36–66)
SODIUM BLD-SCNC: 139 MMOL/L (ref 135–145)
TOTAL IMMATURE NEUTOROPHIL: 0.02 K/CU MM
WBC # BLD: 5.4 K/CU MM (ref 4–10.5)
WBC # BLD: ABNORMAL 10*3/UL

## 2023-01-31 PROCEDURE — 85025 COMPLETE CBC W/AUTO DIFF WBC: CPT

## 2023-01-31 PROCEDURE — 94761 N-INVAS EAR/PLS OXIMETRY MLT: CPT

## 2023-01-31 PROCEDURE — 86140 C-REACTIVE PROTEIN: CPT

## 2023-01-31 PROCEDURE — 6360000002 HC RX W HCPCS: Performed by: STUDENT IN AN ORGANIZED HEALTH CARE EDUCATION/TRAINING PROGRAM

## 2023-01-31 PROCEDURE — 93005 ELECTROCARDIOGRAM TRACING: CPT | Performed by: STUDENT IN AN ORGANIZED HEALTH CARE EDUCATION/TRAINING PROGRAM

## 2023-01-31 PROCEDURE — 2580000003 HC RX 258: Performed by: STUDENT IN AN ORGANIZED HEALTH CARE EDUCATION/TRAINING PROGRAM

## 2023-01-31 PROCEDURE — 6370000000 HC RX 637 (ALT 250 FOR IP): Performed by: STUDENT IN AN ORGANIZED HEALTH CARE EDUCATION/TRAINING PROGRAM

## 2023-01-31 PROCEDURE — 2060000000 HC ICU INTERMEDIATE R&B

## 2023-01-31 PROCEDURE — 94640 AIRWAY INHALATION TREATMENT: CPT

## 2023-01-31 PROCEDURE — 6370000000 HC RX 637 (ALT 250 FOR IP): Performed by: NURSE PRACTITIONER

## 2023-01-31 PROCEDURE — 80048 BASIC METABOLIC PNL TOTAL CA: CPT

## 2023-01-31 PROCEDURE — 2700000000 HC OXYGEN THERAPY PER DAY

## 2023-01-31 PROCEDURE — 84145 PROCALCITONIN (PCT): CPT

## 2023-01-31 RX ORDER — AMLODIPINE BESYLATE 5 MG/1
5 TABLET ORAL DAILY
Status: DISCONTINUED | OUTPATIENT
Start: 2023-01-31 | End: 2023-02-02

## 2023-01-31 RX ORDER — IPRATROPIUM BROMIDE AND ALBUTEROL SULFATE 2.5; .5 MG/3ML; MG/3ML
1 SOLUTION RESPIRATORY (INHALATION) 2 TIMES DAILY
Status: DISCONTINUED | OUTPATIENT
Start: 2023-02-01 | End: 2023-02-03 | Stop reason: HOSPADM

## 2023-01-31 RX ORDER — IPRATROPIUM BROMIDE AND ALBUTEROL SULFATE 2.5; .5 MG/3ML; MG/3ML
1 SOLUTION RESPIRATORY (INHALATION)
Status: DISCONTINUED | OUTPATIENT
Start: 2023-01-31 | End: 2023-01-31

## 2023-01-31 RX ADMIN — Medication 10 ML: at 09:21

## 2023-01-31 RX ADMIN — ENOXAPARIN SODIUM 30 MG: 100 INJECTION SUBCUTANEOUS at 09:20

## 2023-01-31 RX ADMIN — METHYLPREDNISOLONE SODIUM SUCCINATE 40 MG: 40 INJECTION, POWDER, FOR SOLUTION INTRAMUSCULAR; INTRAVENOUS at 09:20

## 2023-01-31 RX ADMIN — CEFAZOLIN 2000 MG: 2 INJECTION, POWDER, FOR SOLUTION INTRAMUSCULAR; INTRAVENOUS at 01:28

## 2023-01-31 RX ADMIN — Medication 10 ML: at 21:31

## 2023-01-31 RX ADMIN — IPRATROPIUM BROMIDE AND ALBUTEROL SULFATE 1 AMPULE: 2.5; .5 SOLUTION RESPIRATORY (INHALATION) at 00:22

## 2023-01-31 RX ADMIN — FLUTICASONE PROPIONATE 1 SPRAY: 50 SPRAY, METERED NASAL at 09:24

## 2023-01-31 RX ADMIN — CEFAZOLIN 2000 MG: 2 INJECTION, POWDER, FOR SOLUTION INTRAMUSCULAR; INTRAVENOUS at 18:42

## 2023-01-31 RX ADMIN — AMLODIPINE BESYLATE 5 MG: 5 TABLET ORAL at 22:56

## 2023-01-31 RX ADMIN — IPRATROPIUM BROMIDE AND ALBUTEROL SULFATE 1 AMPULE: 2.5; .5 SOLUTION RESPIRATORY (INHALATION) at 04:18

## 2023-01-31 RX ADMIN — CEFAZOLIN 2000 MG: 2 INJECTION, POWDER, FOR SOLUTION INTRAMUSCULAR; INTRAVENOUS at 09:38

## 2023-01-31 RX ADMIN — ENOXAPARIN SODIUM 30 MG: 100 INJECTION SUBCUTANEOUS at 21:30

## 2023-01-31 RX ADMIN — IPRATROPIUM BROMIDE AND ALBUTEROL SULFATE 1 AMPULE: 2.5; .5 SOLUTION RESPIRATORY (INHALATION) at 09:19

## 2023-01-31 NOTE — RT PROTOCOL NOTE
RT Inhaler-Nebulizer Bronchodilator Protocol Note    There is a bronchodilator order in the chart from a provider indicating to follow the RT Bronchodilator Protocol and there is an Initiate RT Inhaler-Nebulizer Bronchodilator Protocol order as well (see protocol at bottom of note). CXR Findings:  XR CHEST PORTABLE    Result Date: 1/30/2023  Nonspecific pulmonary infiltrates are commonly seen with atypical/viral pneumonia. Correlate with recent CTA chest results. Pulmonary sequela typical of that seen with smoking, including COPD. The findings from the last RT Protocol Assessment were as follows:   History Pulmonary Disease: Smoker 15 pack years or more  Respiratory Pattern: Mild dyspnea at rest, irregular pattern, or RR 21-25 bpm  Breath Sounds: Clear breath sounds  Cough: Strong, productive  Indication for Bronchodilator Therapy:    Bronchodilator Assessment Score: 6    Aerosolized bronchodilator medication orders have been revised according to the RT Inhaler-Nebulizer Bronchodilator Protocol below. Respiratory Therapist to perform RT Therapy Protocol Assessment initially then follow the protocol. Repeat RT Therapy Protocol Assessment PRN for score 0-3 or on second treatment, BID, and PRN for scores above 3. No Indications - adjust the frequency to every 6 hours PRN wheezing or bronchospasm, if no treatments needed after 48 hours then discontinue using Per Protocol order mode. If indication present, adjust the RT bronchodilator orders based on the Bronchodilator Assessment Score as indicated below. Use Inhaler orders unless patient has one or more of the following: on home nebulizer, not able to hold breath for 10 seconds, is not alert and oriented, cannot activate and use MDI correctly, or respiratory rate 25 breaths per minute or more, then use the equivalent nebulizer order(s) with same Frequency and PRN reasons based on the score.   If a patient is on this medication at home then do not decrease Frequency below that used at home. 0-3 - enter or revise RT bronchodilator order(s) to equivalent RT Bronchodilator order with Frequency of every 4 hours PRN for wheezing or increased work of breathing using Per Protocol order mode. 4-6 - enter or revise RT Bronchodilator order(s) to two equivalent RT bronchodilator orders with one order with BID Frequency and one order with Frequency of every 4 hours PRN wheezing or increased work of breathing using Per Protocol order mode. 7-10 - enter or revise RT Bronchodilator order(s) to two equivalent RT bronchodilator orders with one order with TID Frequency and one order with Frequency of every 4 hours PRN wheezing or increased work of breathing using Per Protocol order mode. 11-13 - enter or revise RT Bronchodilator order(s) to one equivalent RT bronchodilator order with QID Frequency and an Albuterol order with Frequency of every 4 hours PRN wheezing or increased work of breathing using Per Protocol order mode. Greater than 13 - enter or revise RT Bronchodilator order(s) to one equivalent RT bronchodilator order with every 4 hours Frequency and an Albuterol order with Frequency of every 2 hours PRN wheezing or increased work of breathing using Per Protocol order mode. RT to enter RT Home Evaluation for COPD & MDI Assessment order using Per Protocol order mode.     Electronically signed by Catrina Lala RCP on 1/31/2023 at 4:03 PM

## 2023-01-31 NOTE — PROGRESS NOTES
Removed Vapotherm from pt and placed a high flow nasal cannula at 5L with 02 saturations at 95%. Will continue to monitor pt.

## 2023-01-31 NOTE — PROGRESS NOTES
V2.0  [facility] Hospitalist Progress Note      Name:  Florian Velasquez /Age/Sex: 1960  (60 y.o. male)   MRN & CSN:  [id number] & [id number] Encounter Date/Time: 2023 6:24 PM EST    Location:  -A PCP: Dario Hightower [address] [facility] Day: 5    Assessment and Plan:   Florian Velasquez is a 60 y.o. male with no significant past medical history except for tobacco abuse presented with progressively worsening shortness of breath. Acute hypoxic respiratory failure  Patient with no history of asthma or COPD. Patient is a current and longtime smoker. Has respiratory failure with a large oxygen requirement. Pulmonology consulted  Continue  supplemental oxygen: Able to titrate down to 5 L  Titrate supplemental oxygen to target so2 >92%  I will also place the patient on IV steroids and scheduled duo nebs    Sepsis present on admission  Likely secondary to meta pneumovirus with superimposed MSSA pneumonia  CT angiogram of the chest without pulmonary embolism, multiple scattered consolidations concerning for multifocal pneumonia. Sputum culture positive for staph aureus  Continue [facility]  Infectious disease consulted: ABX per ID  Follow-up blood cultures    Tobacco abuse  Nicotine replacement therapy  Counseled regarding cessation    Diet ADULT DIET; Regular   DVT Prophylaxis [x] Lovenox, []  Heparin, [] SCDs, [] Ambulation,  [] Eliquis, [] Xarelto  [] Coumadin   Code Status Full Code   Disposition From: Home  Expected Disposition: Home  Estimated Date of Discharge: 2-5 days  Patient requires continued admission due to Respiratory failure   Surrogate Decision Maker/ POA Spouse     Subjective:     Chief Complaint: SOB     Patient today had an improvement in his oxygenation. Able to be titrated off of Vapotherm to 5 L. Objective:      Intake/Output Summary (Last 24 hours) at 2023 1534  Last data filed at 2023 0501  Gross per 24 hour   Intake 350 ml   Output 1275 ml   Net -925 ml Vitals:   Vitals:    01/31/23 1200   BP: (!) 169/75   Pulse: 73   Resp: 19   Temp:    SpO2:        Physical Exam:   Physical Exam  Vitals reviewed. Constitutional:       Appearance: Normal appearance. He is normal weight. HENT:      Head: Normocephalic and atraumatic. Nose: Nose normal.      Mouth/Throat:      Mouth: Mucous membranes are dry. Pharynx: Oropharynx is clear. Eyes:      General: No scleral icterus. Conjunctiva/sclera: Conjunctivae normal.   Cardiovascular:      Rate and Rhythm: Normal rate and regular rhythm. Pulses: Normal pulses. Heart sounds: Normal heart sounds. No murmur heard. Pulmonary:      Effort: Pulmonary effort is normal.      Breath sounds: Rales present. No wheezing or rhonchi. Abdominal:      General: Bowel sounds are normal. There is no distension. Palpations: Abdomen is soft. Tenderness: There is no abdominal tenderness. Musculoskeletal:         General: No deformity. Normal range of motion. Cervical back: Neck supple. No rigidity. Right lower leg: No edema. Left lower leg: No edema. Skin:     Coloration: Skin is not jaundiced or pale. Neurological:      General: No focal deficit present. Mental Status: He is alert and oriented to person, place, and time. Mental status is at baseline.           Medications:   Medications:    ipratropium-albuterol  1 ampule Inhalation Q4H WA    ceFAZolin  2,000 mg IntraVENous Q8H    methylPREDNISolone  40 mg IntraVENous Daily    fluticasone  1 spray Each Nostril Daily    sodium chloride flush  5-40 mL IntraVENous 2 times per day    enoxaparin  30 mg SubCUTAneous BID    nicotine  1 patch TransDERmal Daily      Infusions:    sodium chloride       PRN Meds: sodium chloride flush, 5-40 mL, PRN  sodium chloride, , PRN  polyethylene glycol, 17 g, Daily PRN  ipratropium-albuterol, 1 ampule, Q4H PRN  guaiFENesin-dextromethorphan, 5 mL, Q4H PRN      Labs      Recent Results (from the past 24 hour(s))   Basic Metabolic Panel w/ Reflex to MG    Collection Time: 01/31/23  1:11 AM   Result Value Ref Range    Sodium 139 135 - 145 MMOL/L    Potassium 4.4 3.5 - 5.1 MMOL/L    Chloride 106 99 - 110 mMol/L    CO2 22 21 - 32 MMOL/L    Anion Gap 11 4 - 16    BUN 27 (H) 6 - 23 MG/DL    Creatinine 1.1 0.9 - 1.3 MG/DL    Est, Glom Filt Rate >60 >60 mL/min/1.73m2    Glucose 154 (H) 70 - 99 MG/DL    Calcium 8.4 8.3 - 10.6 MG/DL   CBC with Auto Differential    Collection Time: 01/31/23  1:11 AM   Result Value Ref Range    WBC 5.4 4.0 - 10.5 K/CU MM    RBC 3.88 (L) 4.6 - 6.2 M/CU MM    Hemoglobin 12.0 (L) 13.5 - 18.0 GM/DL    Hematocrit 35.9 (L) 42 - 52 %    MCV 92.5 78 - 100 FL    MCH 30.9 27 - 31 PG    MCHC 33.4 32.0 - 36.0 %    RDW 13.3 11.7 - 14.9 %    Platelets 973 807 - 812 K/CU MM    MPV 10.5 7.5 - 11.1 FL    Immature Neutrophil % 0.4 0 - 0.43 %    Segs Relative 72.9 (H) 36 - 66 %    Eosinophils % 0.0 0 - 3 %    Basophils % 0.2 0 - 1 %    Lymphocytes % 20.4 (L) 24 - 44 %    Monocytes % 6.1 (H) 0 - 4 %    Total Immature Neutrophil 0.02 K/CU MM    Segs Absolute 4.0 K/CU MM    Eosinophils Absolute 0.0 K/CU MM    Basophils Absolute 0.0 K/CU MM    Lymphocytes Absolute 1.1 K/CU MM    Monocytes Absolute 0.3 K/CU MM    Differential Type       AUTOMATED DIFF RESULTS CONFIRMED BY SMEAR REVIEW  AUTOMATED DIFFERENTIAL      WBC Morphology OCCASIONAL    Procalcitonin    Collection Time: 01/31/23  1:11 AM   Result Value Ref Range    Procalcitonin 0.156    C-Reactive Protein    Collection Time: 01/31/23  1:11 AM   Result Value Ref Range    CRP High Sensitivity 67.4 (H) <5.0 mg/L        Imaging/Diagnostics Last 24 Hours   XR CHEST PORTABLE    Result Date: 1/27/2023  EXAMINATION: ONE XRAY VIEW OF THE CHEST 1/27/2023 10:57 pm COMPARISON: 01/07/2018.  HISTORY: ORDERING SYSTEM PROVIDED HISTORY: dyspnea TECHNOLOGIST PROVIDED HISTORY: Reason for exam:->dyspnea Reason for Exam: dyspnea FINDINGS: Frontal portable view of the chest. Normal lung volume. Multifocal bilateral heterogeneous opacities are more confluent in the right upper lobe. Normal pulmonary vasculature. No pleural effusion or pneumothorax. Borderline cardiomegaly. No acute osseous abnormality. 1.  Multifocal bilateral heterogeneous opacities are more confluent in the right upper lobe. Differential considerations include multifocal pneumonia and asymmetric pulmonary edema. Follow-up PA and lateral chest radiographs 6 weeks after the onset of appropriate medical therapy may be helpful to document improvement. 2.  Borderline cardiomegaly. CTA PULMONARY W CONTRAST    Result Date: 1/29/2023  EXAMINATION: CTA OF THE CHEST 1/28/2023 12:02 am TECHNIQUE: CTA of the chest was performed after the administration of intravenous contrast.  Multiplanar reformatted images are provided for review. MIP images are provided for review. Automated exposure control, iterative reconstruction, and/or weight based adjustment of the mA/kV was utilized to reduce the radiation dose to as low as reasonably achievable. COMPARISON: January 27, 2023 HISTORY: ORDERING SYSTEM PROVIDED HISTORY: dyspnea/hypoxia TECHNOLOGIST PROVIDED HISTORY: Reason for exam:->dyspnea/hypoxia Decision Support Exception - unselect if not a suspected or confirmed emergency medical condition->Emergency Medical Condition (MA) Reason for Exam: dyspnea/hypoxia FINDINGS: Pulmonary Arteries: Pulmonary arteries are adequately opacified for evaluation. No evidence of intraluminal filling defect to suggest pulmonary embolism. Evaluation is severely limited by motion. Main pulmonary artery is normal in caliber. Mediastinum: The heart is normal in size without a pericardial effusion. The aorta and arch branches are normal in course and caliber. No pathologically enlarged mediastinal or hilar nodes. Lungs/pleura: Severe emphysema. Patchy airspace opacities are identified bilaterally, right greater than left.   No pleural effusions. Central airways are patent. Diffuse bronchial wall thickening without bronchiectasis. No dominant or suspicious pulmonary nodules. Upper Abdomen: Small hiatal hernia. Soft Tissues/Bones: No acute bone or soft tissue abnormality. 1. Motion limited exam. 2. No acute cardiopulmonary disease. 3. Multifocal pneumonia, right worse than left.  RECOMMENDATIONS: Unavailable       Electronically signed by Kay Armendariz MD on 1/31/2023 at 3:34 PM

## 2023-01-31 NOTE — CONSULTS
Subjective:   CHIEF COMPLAINT / HPI:  60year old male admitted with increasing sob and occasional wheeze.he has cough with green sputum production. He denies any chest pain or hemoptysis. He was on vapotherm but now is on nasal cannula. Past Medical History:  Past Medical History:   Diagnosis Date    Bilateral hand pain     Pneumonia 01/07/2018    treated in ED    Rheumatoid arthritis ([address]     Smoker        Past Surgical History:    No past surgical history on file. Current Medications:    Current Facility-Administered Medications: ipratropium-albuterol (DUONEB) nebulizer solution 1 ampule, 1 ampule, Inhalation, Q4H WA  ceFAZolin (ANCEF) 2,000 mg in sodium chloride 0.9 % 50 mL IVPB (mini-bag), 2,000 mg, IntraVENous, Q8H  methylPREDNISolone sodium (SOLU-MEDROL) injection 40 mg, 40 mg, IntraVENous, Daily  fluticasone (FLONASE) 50 MCG/ACT nasal spray 1 spray, 1 spray, Each Nostril, Daily  sodium chloride flush 0.9 % injection 5-40 mL, 5-40 mL, IntraVENous, 2 times per day  sodium chloride flush 0.9 % injection 5-40 mL, 5-40 mL, IntraVENous, PRN  0.9 % sodium chloride infusion, , IntraVENous, PRN  enoxaparin Sodium (LOVENOX) injection 30 mg, 30 mg, SubCUTAneous, BID  polyethylene glycol (GLYCOLAX) packet 17 g, 17 g, Oral, Daily PRN  ipratropium-albuterol (DUONEB) nebulizer solution 1 ampule, 1 ampule, Inhalation, Q4H PRN  guaiFENesin-dextromethorphan (ROBITUSSIN DM) 100-10 MG/5ML syrup 5 mL, 5 mL, Oral, Q4H PRN  nicotine (NICODERM CQ) 21 MG/24HR 1 patch, 1 patch, TransDERmal, Daily    Allergies   Allergen Reactions    Pcn [Penicillins]        Social History:    Social History     Socioeconomic History    Marital status:      Spouse name: Becca Scott    Number of children: 3    Years of education: 12   Occupational History    Occupation: [employer]   Tobacco Use    Smoking status: Every Day     Packs/day: 0.50     Types: Cigarettes    Smokeless tobacco: Never   Substance and Sexual Activity    Alcohol use:  Yes Comment: drinks occasionaly     Drug use: No    Sexual activity: Yes     Partners: Female   Social History Narrative    NO family history of violence    YES does feel safe in the home    NO gun in the home       Family History:   Family History   Problem Relation Age of Onset    Cancer Mother     Diabetes Mother     Allergies Mother     High Cholesterol Mother     Heart Failure Mother     Migraines Mother     Hypertension Mother     Diabetes Sister     High Blood Pressure Sister     Osteoarthritis Father     Rheum Arthritis Father     Asthma Father     Mental Illness Brother         anger management    Hypertension Brother     Diabetes Brother     No Known Problems Sister     No Known Problems Sister     No Known Problems Sister        Immunization:  Immunization History   Administered Date(s) Administered    Influenza Vaccine, unspecified formulation 08/30/2017         REVIEW OF SYSTEMS:    CONSTITUTIONAL:  negative for fevers, chills, diaphoresis, activity change, appetite change, fatigue, night sweats and unexpected weight change.    EYES:  negative for blurred vision, eye discharge, visual disturbance and icterus  HEENT:  negative for hearing loss, tinnitus, ear drainage, sinus pressure, nasal congestion, epistaxis and snoring  RESPIRATORY:  See HPI  CARDIOVASCULAR:  negative for chest pain, palpitations, exertional chest pressure/discomfort, edema, syncope  GASTROINTESTINAL:  negative for nausea, vomiting, diarrhea, constipation, blood in stool and abdominal pain  GENITOURINARY:  negative for frequency, dysuria and hematuria  HEMATOLOGIC/LYMPHATIC:  negative for easy bruising, bleeding and lymphadenopathy  ALLERGIC/IMMUNOLOGIC:  negative for recurrent infections, angioedema, anaphylaxis and drug reactions  ENDOCRINE:  negative for weight changes and diabetic symptoms including polyuria, polydipsia and polyphagia    MUSCULOSKELETAL:  negative for  pain, joint swelling, decreased range of motion and muscle weakness  NEUROLOGICAL:  negative for headaches, slurred speech, unilateral weakness  PSYCHIATRIC/BEHAVIORAL: negative for hallucinations, behavioral problems, confusion and agitation. Objective:   PHYSICAL EXAM:      VITALS:    Vitals:    01/31/23 0500 01/31/23 0510 01/31/23 0816 01/31/23 0922   BP:   (!) 147/74    Pulse: 63  61    Resp: 19  18    Temp:   98.3 °F (36.8 °C)    TempSrc:   Oral    SpO2: 99% 99% 100% 99%   Weight:       Height:             CONSTITUTIONAL:  awake, alert, cooperative, no apparent distress, and appears stated age  NECK:  Supple, symmetrical, trachea midline, no adenopathy, thyroid symmetric, not enlarged and no tenderness  CHEST: Chest expansion equal and symmetrical, no intercostal retraction. LUNGS:  no increased work of breathing, has expiratory wheezes both lungs, no crackles. CARDIOVASCULAR: S1 and S2, no edema and no JVD  ABDOMEN:  normal bowel sounds, non-distended and no masses palpated, and no tenderness to palpation. No hepatospleenomegaly  LYMPHADENOPATHY:  no axillary or supraclavicular adenopathy. No cervical adnenopathy  PSYCHIATRIC: Oriented to person place and time. No obvious depression or anxiety. MUSCULOSKELETAL: No obvious misalignment or effusion of the joints. No clubbing, cyanosis of the digits. RIGHT AND LEFT LOWER EXTREMITIES: No edema, no inflammation, no tenderness. SKIN:  normal skin color, texture, turgor and no redness, warmth, or swelling. No palpable nodules    DATA:         EXAMINATION:   CTA OF THE CHEST 1/28/2023 12:02 am       TECHNIQUE:   CTA of the chest was performed after the administration of intravenous   contrast.  Multiplanar reformatted images are provided for review. MIP   images are provided for review. Automated exposure control, iterative   reconstruction, and/or weight based adjustment of the mA/kV was utilized to   reduce the radiation dose to as low as reasonably achievable.        COMPARISON:   January 27, 2023       HISTORY: ORDERING SYSTEM PROVIDED HISTORY: dyspnea/hypoxia   TECHNOLOGIST PROVIDED HISTORY:   Reason for exam:->dyspnea/hypoxia   Decision Support Exception - unselect if not a suspected or confirmed   emergency medical condition->Emergency Medical Condition (MA)   Reason for Exam: dyspnea/hypoxia       FINDINGS:   Pulmonary Arteries: Pulmonary arteries are adequately opacified for   evaluation. No evidence of intraluminal filling defect to suggest pulmonary   embolism. Evaluation is severely limited by motion. Main pulmonary artery   is normal in caliber. Mediastinum: The heart is normal in size without a pericardial effusion. The   aorta and arch branches are normal in course and caliber. No pathologically enlarged mediastinal or hilar nodes. Lungs/pleura: Severe emphysema. Patchy airspace opacities are identified   bilaterally, right greater than left. No pleural effusions. Central airways   are patent. Diffuse bronchial wall thickening without bronchiectasis. No dominant or suspicious pulmonary nodules. Upper Abdomen: Small hiatal hernia. Soft Tissues/Bones: No acute bone or soft tissue abnormality. Impression   1. Motion limited exam.   2. No acute cardiopulmonary disease. 3. Multifocal pneumonia, right worse than left. RECOMMENDATIONS:   Unavailable                   Assessment:      Ac hypoxemic resp failure  MSSA pneumonia  Ac bronchospasm  Positive human metapneumovirus          Plan:     D/w pt  Adequate o2 adm  Vapotherm as needed  Bd rx as needed  Robitussin  Antibx as per ID  Thanks will follow

## 2023-02-01 LAB
ANION GAP SERPL CALCULATED.3IONS-SCNC: 9 MMOL/L (ref 4–16)
BASOPHILS ABSOLUTE: 0 K/CU MM
BASOPHILS RELATIVE PERCENT: 0.1 % (ref 0–1)
BUN SERPL-MCNC: 20 MG/DL (ref 6–23)
CALCIUM SERPL-MCNC: 9.2 MG/DL (ref 8.3–10.6)
CHLORIDE BLD-SCNC: 107 MMOL/L (ref 99–110)
CO2: 25 MMOL/L (ref 21–32)
CREAT SERPL-MCNC: 1 MG/DL (ref 0.9–1.3)
CRP SERPL HS-MCNC: 29.6 MG/L
DIFFERENTIAL TYPE: ABNORMAL
EOSINOPHILS ABSOLUTE: 0 K/CU MM
EOSINOPHILS RELATIVE PERCENT: 0.3 % (ref 0–3)
GFR SERPL CREATININE-BSD FRML MDRD: >60 ML/MIN/1.73M2
GLUCOSE SERPL-MCNC: 87 MG/DL (ref 70–99)
HCT VFR BLD CALC: 39.1 % (ref 42–52)
HEMOGLOBIN: 12.6 GM/DL (ref 13.5–18)
IMMATURE NEUTROPHIL %: 0.5 % (ref 0–0.43)
LYMPHOCYTES ABSOLUTE: 3 K/CU MM
LYMPHOCYTES RELATIVE PERCENT: 31.2 % (ref 24–44)
MCH RBC QN AUTO: 30.3 PG (ref 27–31)
MCHC RBC AUTO-ENTMCNC: 32.2 % (ref 32–36)
MCV RBC AUTO: 94 FL (ref 78–100)
MONOCYTES ABSOLUTE: 0.6 K/CU MM
MONOCYTES RELATIVE PERCENT: 6.1 % (ref 0–4)
PDW BLD-RTO: 13.2 % (ref 11.7–14.9)
PLATELET # BLD: 229 K/CU MM (ref 140–440)
PMV BLD AUTO: 10.1 FL (ref 7.5–11.1)
POTASSIUM SERPL-SCNC: 4.5 MMOL/L (ref 3.5–5.1)
RBC # BLD: 4.16 M/CU MM (ref 4.6–6.2)
SEGMENTED NEUTROPHILS ABSOLUTE COUNT: 5.9 K/CU MM
SEGMENTED NEUTROPHILS RELATIVE PERCENT: 61.8 % (ref 36–66)
SODIUM BLD-SCNC: 141 MMOL/L (ref 135–145)
TOTAL IMMATURE NEUTOROPHIL: 0.05 K/CU MM
WBC # BLD: 9.5 K/CU MM (ref 4–10.5)
WBC # BLD: ABNORMAL 10*3/UL

## 2023-02-01 PROCEDURE — 36415 COLL VENOUS BLD VENIPUNCTURE: CPT

## 2023-02-01 PROCEDURE — 99231 SBSQ HOSP IP/OBS SF/LOW 25: CPT | Performed by: NURSE PRACTITIONER

## 2023-02-01 PROCEDURE — 2580000003 HC RX 258: Performed by: STUDENT IN AN ORGANIZED HEALTH CARE EDUCATION/TRAINING PROGRAM

## 2023-02-01 PROCEDURE — 6360000002 HC RX W HCPCS: Performed by: STUDENT IN AN ORGANIZED HEALTH CARE EDUCATION/TRAINING PROGRAM

## 2023-02-01 PROCEDURE — 6370000000 HC RX 637 (ALT 250 FOR IP): Performed by: STUDENT IN AN ORGANIZED HEALTH CARE EDUCATION/TRAINING PROGRAM

## 2023-02-01 PROCEDURE — 93005 ELECTROCARDIOGRAM TRACING: CPT | Performed by: STUDENT IN AN ORGANIZED HEALTH CARE EDUCATION/TRAINING PROGRAM

## 2023-02-01 PROCEDURE — 94640 AIRWAY INHALATION TREATMENT: CPT

## 2023-02-01 PROCEDURE — 6370000000 HC RX 637 (ALT 250 FOR IP): Performed by: NURSE PRACTITIONER

## 2023-02-01 PROCEDURE — 99222 1ST HOSP IP/OBS MODERATE 55: CPT | Performed by: INTERNAL MEDICINE

## 2023-02-01 PROCEDURE — 80048 BASIC METABOLIC PNL TOTAL CA: CPT

## 2023-02-01 PROCEDURE — 94761 N-INVAS EAR/PLS OXIMETRY MLT: CPT

## 2023-02-01 PROCEDURE — 2060000000 HC ICU INTERMEDIATE R&B

## 2023-02-01 PROCEDURE — 85025 COMPLETE CBC W/AUTO DIFF WBC: CPT

## 2023-02-01 PROCEDURE — 86140 C-REACTIVE PROTEIN: CPT

## 2023-02-01 PROCEDURE — 2700000000 HC OXYGEN THERAPY PER DAY

## 2023-02-01 PROCEDURE — 6370000000 HC RX 637 (ALT 250 FOR IP): Performed by: INTERNAL MEDICINE

## 2023-02-01 RX ORDER — ATROPINE SULFATE 1 MG/ML
1 INJECTION, SOLUTION INTRAMUSCULAR; INTRAVENOUS; SUBCUTANEOUS PRN
Status: DISCONTINUED | OUTPATIENT
Start: 2023-02-01 | End: 2023-02-03 | Stop reason: HOSPADM

## 2023-02-01 RX ORDER — LINEZOLID 600 MG/1
600 TABLET, FILM COATED ORAL EVERY 12 HOURS SCHEDULED
Status: DISCONTINUED | OUTPATIENT
Start: 2023-02-01 | End: 2023-02-03 | Stop reason: HOSPADM

## 2023-02-01 RX ADMIN — IPRATROPIUM BROMIDE AND ALBUTEROL SULFATE 1 AMPULE: 2.5; .5 SOLUTION RESPIRATORY (INHALATION) at 07:28

## 2023-02-01 RX ADMIN — LINEZOLID 600 MG: 600 TABLET, FILM COATED ORAL at 13:03

## 2023-02-01 RX ADMIN — ENOXAPARIN SODIUM 30 MG: 100 INJECTION SUBCUTANEOUS at 09:29

## 2023-02-01 RX ADMIN — CEFAZOLIN 2000 MG: 2 INJECTION, POWDER, FOR SOLUTION INTRAMUSCULAR; INTRAVENOUS at 01:37

## 2023-02-01 RX ADMIN — FLUTICASONE PROPIONATE 1 SPRAY: 50 SPRAY, METERED NASAL at 09:31

## 2023-02-01 RX ADMIN — CEFAZOLIN 2000 MG: 2 INJECTION, POWDER, FOR SOLUTION INTRAMUSCULAR; INTRAVENOUS at 09:39

## 2023-02-01 RX ADMIN — Medication 10 ML: at 21:30

## 2023-02-01 RX ADMIN — IPRATROPIUM BROMIDE AND ALBUTEROL SULFATE 1 AMPULE: 2.5; .5 SOLUTION RESPIRATORY (INHALATION) at 19:42

## 2023-02-01 RX ADMIN — Medication 10 ML: at 09:29

## 2023-02-01 RX ADMIN — METHYLPREDNISOLONE SODIUM SUCCINATE 40 MG: 40 INJECTION, POWDER, FOR SOLUTION INTRAMUSCULAR; INTRAVENOUS at 09:28

## 2023-02-01 RX ADMIN — AMLODIPINE BESYLATE 5 MG: 5 TABLET ORAL at 09:29

## 2023-02-01 RX ADMIN — ENOXAPARIN SODIUM 30 MG: 100 INJECTION SUBCUTANEOUS at 21:30

## 2023-02-01 NOTE — PROGRESS NOTES
02/01/23 6080   Encounter Summary   Encounter Overview/Reason  Initial Encounter   Service Provided For: Patient   Referral/Consult From: [facility]   Support System Spouse; Children   Last Encounter  02/01/23  (Offered prayer and spiritual support, asked the nurse for kenny.)   Complexity of Encounter Low   Begin Time 0718   End Time  0744   Total Time Calculated 26 min   Encounter    Type Initial Screen/Assessment   Spiritual/Emotional needs   Type Spiritual Support   Assessment/Intervention/Outcome   Assessment Hopeful   Intervention Active listening;Discussed belief system/Yarsani practices/umberto;Discussed meaning/purpose;Explored/Affirmed feelings, thoughts, concerns;Prayer (assurance of)/[place];Sustaining Presence/Ministry of presence; Life review/Legacy   Outcome Engaged in conversation;Expressed Gratitude   Plan and Referrals   Plan/Referrals Continue Support (comment)

## 2023-02-01 NOTE — PROGRESS NOTES
CNP notified about patient blood pressure 183/94, patient is alert and oriented x4 and asymptomatic, patient stated \" he takes Amlodipine 5mg daily at home\".  Medication administered as ordered by CNP

## 2023-02-01 NOTE — PROGRESS NOTES
Hospitalist paged regarding patient's HR dropping into the 30s and having pauses. EKG obtained--day shift RN updated. Patient asymptomatic. Will await new orders.

## 2023-02-01 NOTE — PROGRESS NOTES
Infectious Disease Progress Note  2023   Patient Name: Alexei Blount : 1960   Impression  Sepsis Secondary to MSSA Multifocal Pneumonia Complicated by Acute Hypoxic Respiratory Failure:  Human [place]-Pneumovirus by PCR:  Afebrile, no leukocytosis, CRP and Pct on DWT, patient has clinically improved, now on oxygen per NC, will have home oxygen trial today prior to DC  -BC 0/2-NGTD  CrCl 100  -Resp culture: MSSA  -Legionella ag, Strep pneumo negative  -Human [place]-pneumovirus Positive by PCR  -Urine culture: NGTD  -MRSA/MSSA screen Pending  -CTA Pulmonary W Contrast: 1. Motion limited exam.   2. No acute cardiopulmonary disease. 3. Multifocal pneumonia, right worse than left. Dr. Marianela Dwyer consulted for pulmonology   RA:  Reports mgt per Dr. Luisa Hall with po Naproxen and Tylenol  Reports was on an another oral regimen 2 months ago (unsure of the name)  HTN:  Tobacco Abuse:  Multi-morbidity: per PMHx     Plan:  DC IV cefazolin  Start po linezolid 600 mg bid x 14 day course (end date 2/15/2023)  Follow up with PCP after DC  OK from ID standpoint to DC when ready    Ongoing Antimicrobial Therapy  Linezolid -? Completed Antimicrobial Therapy  Azithromycin -  Ceftriaxone -  Cefazolin -? History:? Interval history noted  Denies n/v/d/f or untoward effects of antibiotics. States is feeling well, wanting to go home today, his breathing has improved. Physical Exam:  Vital Signs: /75   Pulse 54   Temp 97.6 °F (36.4 °C) (Oral)   Resp 16   Ht 6' 3\" (1.905 m)   Wt 235 lb (106.6 kg)   SpO2 95%   BMI 29.37 kg/m²     Gen: remains A&O  Chest: no distress and CTA. Posterior breath sounds diminished bases, clear upper lobes. Oxygen per NC  Heart: NSR and no MRG. Abd: soft, non-distended, no tenderness, no hepatomegaly. Normoactive bowel sounds. Ext: no clubbing, cyanosis, or edema  Neuro: Mental status intact.  CN 2-12 intact and no focal sensory or motor deficits     Radiologic / Imaging / TESTING  1/27/2023 XR Chest Portable:  Impression   1. Multifocal bilateral heterogeneous opacities are more confluent in the   right upper lobe. Differential considerations include multifocal pneumonia   and asymmetric pulmonary edema. Follow-up PA and lateral chest radiographs 6   weeks after the onset of appropriate medical therapy may be helpful to   document improvement. 2.  Borderline cardiomegaly. 1/28/2023 CTA Pulmonary W Contrast:  Impression   1. Motion limited exam.   2. No acute cardiopulmonary disease. 3. Multifocal pneumonia, right worse than left. 1/30/2023 XR Chest Portable:  Impression   Nonspecific pulmonary infiltrates are commonly seen with atypical/viral   pneumonia. Correlate with recent CTA chest results. Pulmonary sequela typical of that seen with smoking, including COPD.         Labs:    Recent Results (from the past 24 hour(s))   EKG 12 Lead    Collection Time: 01/31/23  4:09 PM   Result Value Ref Range    Ventricular Rate 56 BPM    Atrial Rate 56 BPM    P-R Interval 282 ms    QRS Duration 86 ms    Q-T Interval 446 ms    QTc Calculation (Bazett) 430 ms    P Axis 59 degrees    R Axis 60 degrees    T Axis 67 degrees    Diagnosis       Sinus bradycardia with 1st degree AV block  Minimal voltage criteria for LVH, may be normal variant  Borderline ECG  When compared with ECG of 27-JAN-2023 23:21,  Sinus rhythm has replaced Junctional rhythm  T wave amplitude has increased in Anterior leads  QT has shortened     Basic Metabolic Panel w/ Reflex to MG    Collection Time: 02/01/23  6:21 AM   Result Value Ref Range    Sodium 141 135 - 145 MMOL/L    Potassium 4.5 3.5 - 5.1 MMOL/L    Chloride 107 99 - 110 mMol/L    CO2 25 21 - 32 MMOL/L    Anion Gap 9 4 - 16    BUN 20 6 - 23 MG/DL    Creatinine 1.0 0.9 - 1.3 MG/DL    Est, Glom Filt Rate >60 >60 mL/min/1.73m2    Glucose 87 70 - 99 MG/DL    Calcium 9.2 8.3 - 10.6 MG/DL   CBC with Auto Differential    Collection Time: 02/01/23  6:21 AM   Result Value Ref Range    WBC 9.5 4.0 - 10.5 K/CU MM    RBC 4.16 (L) 4.6 - 6.2 M/CU MM    Hemoglobin 12.6 (L) 13.5 - 18.0 GM/DL    Hematocrit 39.1 (L) 42 - 52 %    MCV 94.0 78 - 100 FL    MCH 30.3 27 - 31 PG    MCHC 32.2 32.0 - 36.0 %    RDW 13.2 11.7 - 14.9 %    Platelets 484 566 - 079 K/CU MM    MPV 10.1 7.5 - 11.1 FL   C-Reactive Protein    Collection Time: 02/01/23  6:21 AM   Result Value Ref Range    CRP High Sensitivity 29.6 (H) <5.0 mg/L   EKG 12 Lead    Collection Time: 02/01/23  7:34 AM   Result Value Ref Range    Ventricular Rate 49 BPM    Atrial Rate 49 BPM    P-R Interval 258 ms    QRS Duration 100 ms    Q-T Interval 474 ms    QTc Calculation (Bazett) 428 ms    P Axis 48 degrees    R Axis 55 degrees    T Axis 69 degrees    Diagnosis       Sinus bradycardia with 1st degree AV block  Minimal voltage criteria for LVH, may be normal variant  Borderline ECG  When compared with ECG of 31-JAN-2023 16:09,  No significant change was found       CULTURE results: Invalid input(s): BLOOD CULTURE,  URINE CULTURE, SURGICAL CULTURE    Diagnosis:  Patient Active Problem List   Diagnosis    Bilateral hand pain    Tobacco dependency    CAP (community acquired pneumonia) due to Chlamydia species    Pneumonia of both lower lobes due to methicillin susceptible Staphylococcus aureus (MSSA) ([address]    Acute respiratory failure with hypoxia ([address]    Multifocal pneumonia       Active Problems  Principal Problem:    CAP (community acquired pneumonia) due to Chlamydia species  Active Problems:    Pneumonia of both lower lobes due to methicillin susceptible Staphylococcus aureus (MSSA) (HCC)    Acute respiratory failure with hypoxia (HCC)    Multifocal pneumonia  Resolved Problems:    * No resolved hospital problems. *    Electronically signed by: Electronically signed by Sharon Ibarra.  YANIQUE Bruno CNP on 2/1/2023 at 10:05 AM

## 2023-02-01 NOTE — PROGRESS NOTES
pulmonary      SUBJECTIVE:  he feels a lot better and wants to go home     OBJECTIVE    VITALS:  BP (!) 168/85   Pulse 54   Temp 97.6 °F (36.4 °C) (Oral)   Resp 16   Ht 6' 3\" (1.905 m)   Wt 235 lb (106.6 kg)   SpO2 94%   BMI 29.37 kg/m²   HEAD AND FACE EXAM:  No throat injection, no active exudate,no thrush  NECK EXAM;No JVD, no masses, symmetrical  CHEST EXAM; Expansion equal and symmetrical, no masses  LUNG EXAM; Good breath sounds bilaterally. There are expiratory wheezes both lungs, there are crackles at both lung bases  CARDIOVASCULAR EXAM: Positive S1 and S2, no S3 or S4, no clicks ,no murmurs  RIGHT AND LEFT LOWER EXTRIMITY EXAM: No edema, no swelling, no inflamation  CNS EXAM: Alert and oriented X3          LABS   Lab Results   Component Value Date    WBC 9.5 02/01/2023    HGB 12.6 (L) 02/01/2023    HCT 39.1 (L) 02/01/2023    MCV 94.0 02/01/2023     02/01/2023     Lab Results   Component Value Date    CREATININE 1.1 01/31/2023    BUN 27 (H) 01/31/2023     01/31/2023    K 4.4 01/31/2023     01/31/2023    CO2 22 01/31/2023     Lab Results   Component Value Date    INR 1.21 01/28/2023    PROTIME 15.7 (H) 01/28/2023        No results found for: PHOS   No results for input(s): PH, PO2ART, LUO2CJM, HCO3, BEART, O2SAT in the last 72 hours.       Wt Readings from Last 3 Encounters:   01/27/23 235 lb (106.6 kg)   07/20/21 230 lb (104.3 kg)   01/18/18 220 lb 3.2 oz (99.9 kg)               ASSESMENT  Ac hypoxic resp failure  Mssa pneumonia  Positive metapneumovirus        PLAN  Antibx  Bd rx as needed  Wean off o2    2/1/2023  Cephas Litten, MD, M.D.

## 2023-02-01 NOTE — PROGRESS NOTES
V2.0  [facility] Hospitalist Progress Note      Name:  Stephanie Null /Age/Sex: 1960  (60 y.o. male)   MRN & CSN:  [id number] & [id number] Encounter Date/Time: 2023 6:24 PM EST    Location:  -A PCP: Jeronimo King, [address] [facility] Day: 6    Assessment and Plan:   Stephanie Null is a 60 y.o. male with no significant past medical history except for tobacco abuse presented with progressively worsening shortness of breath. Acute hypoxic respiratory failure  Patient with no history of asthma or COPD. Patient is a current and longtime smoker. Has respiratory failure with a large oxygen requirement. Pulmonology consulted  Continue  supplemental oxygen: Able to titrate down to 2 L  Titrate supplemental oxygen to target so2 >92%  I will also place the patient on IV steroids and scheduled duo nebs    Sepsis present on admission  Likely secondary to meta pneumovirus with superimposed MSSA pneumonia  CT angiogram of the chest without pulmonary embolism, multiple scattered consolidations concerning for multifocal pneumonia. Sputum culture positive for staph aureus  Transition to p.o. Zyvox  Infectious disease consulted: ABX per ID  Follow-up blood cultures    Bradycardia,  Patient with asymptomatic bradycardia. Did have some 3-second pauses on telemetry. Electrolytes are within normal limits. Cardiology consulted  Continue telemetry    Tobacco abuse  Nicotine replacement therapy  Counseled regarding cessation    Diet ADULT DIET; Regular   DVT Prophylaxis [x] Lovenox, []  Heparin, [] SCDs, [] Ambulation,  [] Eliquis, [] Xarelto  [] Coumadin   Code Status Full Code   Disposition From: Home  Expected Disposition: Home  Estimated Date of Discharge: 2-5 days  Patient requires continued admission due to Respiratory failure   Surrogate Decision Maker/ POA Spouse     Subjective:     Chief Complaint: SOB     Continues to have improved his oxygenation. Now down to 2 L.   Overnight he did have some pauses and bradycardia on telemetry. Remains asymptomatic throughout. We will get the patient to be evaluated by cardiology today. Objective: Intake/Output Summary (Last 24 hours) at 2/1/2023 1325  Last data filed at 1/31/2023 1617  Gross per 24 hour   Intake --   Output 700 ml   Net -700 ml          Vitals:   Vitals:    02/01/23 1141   BP: (!) 165/84   Pulse: 72   Resp: 23   Temp: 97.7 °F (36.5 °C)   SpO2: 90%       Physical Exam:   Physical Exam  Vitals reviewed. Constitutional:       Appearance: Normal appearance. He is normal weight. HENT:      Head: Normocephalic and atraumatic. Nose: Nose normal.      Mouth/Throat:      Mouth: Mucous membranes are dry. Pharynx: Oropharynx is clear. Eyes:      General: No scleral icterus. Conjunctiva/sclera: Conjunctivae normal.   Cardiovascular:      Rate and Rhythm: Normal rate and regular rhythm. Pulses: Normal pulses. Heart sounds: Normal heart sounds. No murmur heard. Pulmonary:      Effort: Pulmonary effort is normal.      Breath sounds: Rales present. No wheezing or rhonchi. Abdominal:      General: Bowel sounds are normal. There is no distension. Palpations: Abdomen is soft. Tenderness: There is no abdominal tenderness. Musculoskeletal:         General: No deformity. Normal range of motion. Cervical back: Neck supple. No rigidity. Right lower leg: No edema. Left lower leg: No edema. Skin:     Coloration: Skin is not jaundiced or pale. Neurological:      General: No focal deficit present. Mental Status: He is alert and oriented to person, place, and time. Mental status is at baseline.           Medications:   Medications:    linezolid  600 mg Oral 2 times per day    ipratropium-albuterol  1 ampule Inhalation BID    amLODIPine  5 mg Oral Daily    methylPREDNISolone  40 mg IntraVENous Daily    fluticasone  1 spray Each Nostril Daily    sodium chloride flush  5-40 mL IntraVENous 2 times per day    enoxaparin  30 mg SubCUTAneous BID    nicotine  1 patch TransDERmal Daily      Infusions:    sodium chloride       PRN Meds: sodium chloride flush, 5-40 mL, PRN  sodium chloride, , PRN  polyethylene glycol, 17 g, Daily PRN  ipratropium-albuterol, 1 ampule, Q4H PRN  guaiFENesin-dextromethorphan, 5 mL, Q4H PRN      Labs      Recent Results (from the past 24 hour(s))   EKG 12 Lead    Collection Time: 01/31/23  4:09 PM   Result Value Ref Range    Ventricular Rate 56 BPM    Atrial Rate 56 BPM    P-R Interval 282 ms    QRS Duration 86 ms    Q-T Interval 446 ms    QTc Calculation (Bazett) 430 ms    P Axis 59 degrees    R Axis 60 degrees    T Axis 67 degrees    Diagnosis       Sinus bradycardia with 1st degree AV block  Minimal voltage criteria for LVH, may be normal variant  Borderline ECG  When compared with ECG of 27-JAN-2023 23:21,  Sinus rhythm has replaced Junctional rhythm  T wave amplitude has increased in Anterior leads  QT has shortened     Basic Metabolic Panel w/ Reflex to MG    Collection Time: 02/01/23  6:21 AM   Result Value Ref Range    Sodium 141 135 - 145 MMOL/L    Potassium 4.5 3.5 - 5.1 MMOL/L    Chloride 107 99 - 110 mMol/L    CO2 25 21 - 32 MMOL/L    Anion Gap 9 4 - 16    BUN 20 6 - 23 MG/DL    Creatinine 1.0 0.9 - 1.3 MG/DL    Est, Glom Filt Rate >60 >60 mL/min/1.73m2    Glucose 87 70 - 99 MG/DL    Calcium 9.2 8.3 - 10.6 MG/DL   CBC with Auto Differential    Collection Time: 02/01/23  6:21 AM   Result Value Ref Range    WBC 9.5 4.0 - 10.5 K/CU MM    RBC 4.16 (L) 4.6 - 6.2 M/CU MM    Hemoglobin 12.6 (L) 13.5 - 18.0 GM/DL    Hematocrit 39.1 (L) 42 - 52 %    MCV 94.0 78 - 100 FL    MCH 30.3 27 - 31 PG    MCHC 32.2 32.0 - 36.0 %    RDW 13.2 11.7 - 14.9 %    Platelets 311 414 - 746 K/CU MM    MPV 10.1 7.5 - 11.1 FL    Immature Neutrophil % 0.5 (H) 0 - 0.43 %    Segs Relative 61.8 36 - 66 %    Eosinophils % 0.3 0 - 3 %    Basophils % 0.1 0 - 1 %    Lymphocytes % 31.2 24 - 44 %    Monocytes % 6.1 (H) 0 - 4 %    Total Immature Neutrophil 0.05 K/CU MM    Segs Absolute 5.9 K/CU MM    Eosinophils Absolute 0.0 K/CU MM    Basophils Absolute 0.0 K/CU MM    Lymphocytes Absolute 3.0 K/CU MM    Monocytes Absolute 0.6 K/CU MM    Differential Type       AUTOMATED DIFF RESULTS CONFIRMED BY SMEAR REVIEW  AUTOMATED DIFFERENTIAL      WBC Morphology OCCASIONAL    C-Reactive Protein    Collection Time: 02/01/23  6:21 AM   Result Value Ref Range    CRP High Sensitivity 29.6 (H) <5.0 mg/L   EKG 12 Lead    Collection Time: 02/01/23  7:34 AM   Result Value Ref Range    Ventricular Rate 49 BPM    Atrial Rate 49 BPM    P-R Interval 258 ms    QRS Duration 100 ms    Q-T Interval 474 ms    QTc Calculation (Bazett) 428 ms    P Axis 48 degrees    R Axis 55 degrees    T Axis 69 degrees    Diagnosis       Sinus bradycardia with 1st degree AV block  Minimal voltage criteria for LVH, may be normal variant  Borderline ECG  When compared with ECG of 31-JAN-2023 16:09,  No significant change was found          Imaging/Diagnostics Last 24 Hours   XR CHEST PORTABLE    Result Date: 1/27/2023  EXAMINATION: ONE XRAY VIEW OF THE CHEST 1/27/2023 10:57 pm COMPARISON: 01/07/2018. HISTORY: ORDERING SYSTEM PROVIDED HISTORY: dyspnea TECHNOLOGIST PROVIDED HISTORY: Reason for exam:->dyspnea Reason for Exam: dyspnea FINDINGS: Frontal portable view of the chest.  Normal lung volume. Multifocal bilateral heterogeneous opacities are more confluent in the right upper lobe. Normal pulmonary vasculature. No pleural effusion or pneumothorax. Borderline cardiomegaly. No acute osseous abnormality. 1.  Multifocal bilateral heterogeneous opacities are more confluent in the right upper lobe. Differential considerations include multifocal pneumonia and asymmetric pulmonary edema. Follow-up PA and lateral chest radiographs 6 weeks after the onset of appropriate medical therapy may be helpful to document improvement. 2.  Borderline cardiomegaly.      CTA PULMONARY W CONTRAST    Result Date: 1/29/2023  EXAMINATION: CTA OF THE CHEST 1/28/2023 12:02 am TECHNIQUE: CTA of the chest was performed after the administration of intravenous contrast.  Multiplanar reformatted images are provided for review. MIP images are provided for review. Automated exposure control, iterative reconstruction, and/or weight based adjustment of the mA/kV was utilized to reduce the radiation dose to as low as reasonably achievable. COMPARISON: January 27, 2023 HISTORY: ORDERING SYSTEM PROVIDED HISTORY: dyspnea/hypoxia TECHNOLOGIST PROVIDED HISTORY: Reason for exam:->dyspnea/hypoxia Decision Support Exception - unselect if not a suspected or confirmed emergency medical condition->Emergency Medical Condition (MA) Reason for Exam: dyspnea/hypoxia FINDINGS: Pulmonary Arteries: Pulmonary arteries are adequately opacified for evaluation. No evidence of intraluminal filling defect to suggest pulmonary embolism. Evaluation is severely limited by motion. Main pulmonary artery is normal in caliber. Mediastinum: The heart is normal in size without a pericardial effusion. The aorta and arch branches are normal in course and caliber. No pathologically enlarged mediastinal or hilar nodes. Lungs/pleura: Severe emphysema. Patchy airspace opacities are identified bilaterally, right greater than left. No pleural effusions. Central airways are patent. Diffuse bronchial wall thickening without bronchiectasis. No dominant or suspicious pulmonary nodules. Upper Abdomen: Small hiatal hernia. Soft Tissues/Bones: No acute bone or soft tissue abnormality. 1. Motion limited exam. 2. No acute cardiopulmonary disease. 3. Multifocal pneumonia, right worse than left.  RECOMMENDATIONS: Unavailable       Electronically signed by Latasha Meléndez MD on 2/1/2023 at 1:25 PM

## 2023-02-02 ENCOUNTER — TELEPHONE (OUTPATIENT)
Dept: CARDIOLOGY CLINIC | Age: 63
End: 2023-02-02

## 2023-02-02 DIAGNOSIS — R00.1 BRADYCARDIA: Primary | ICD-10-CM

## 2023-02-02 LAB
ANION GAP SERPL CALCULATED.3IONS-SCNC: 12 MMOL/L (ref 4–16)
BASOPHILS ABSOLUTE: 0 K/CU MM
BASOPHILS RELATIVE PERCENT: 0.2 % (ref 0–1)
BUN SERPL-MCNC: 23 MG/DL (ref 6–23)
CALCIUM SERPL-MCNC: 8.6 MG/DL (ref 8.3–10.6)
CHLORIDE BLD-SCNC: 106 MMOL/L (ref 99–110)
CO2: 22 MMOL/L (ref 21–32)
CREAT SERPL-MCNC: 1 MG/DL (ref 0.9–1.3)
CRP SERPL HS-MCNC: 15.6 MG/L
CULTURE: NORMAL
CULTURE: NORMAL
DIFFERENTIAL TYPE: ABNORMAL
EKG ATRIAL RATE: 49 BPM
EKG ATRIAL RATE: 56 BPM
EKG DIAGNOSIS: NORMAL
EKG DIAGNOSIS: NORMAL
EKG P AXIS: 48 DEGREES
EKG P AXIS: 59 DEGREES
EKG P-R INTERVAL: 258 MS
EKG P-R INTERVAL: 282 MS
EKG Q-T INTERVAL: 446 MS
EKG Q-T INTERVAL: 474 MS
EKG QRS DURATION: 100 MS
EKG QRS DURATION: 86 MS
EKG QTC CALCULATION (BAZETT): 428 MS
EKG QTC CALCULATION (BAZETT): 430 MS
EKG R AXIS: 55 DEGREES
EKG R AXIS: 60 DEGREES
EKG T AXIS: 67 DEGREES
EKG T AXIS: 69 DEGREES
EKG VENTRICULAR RATE: 49 BPM
EKG VENTRICULAR RATE: 56 BPM
EOSINOPHILS ABSOLUTE: 0 K/CU MM
EOSINOPHILS RELATIVE PERCENT: 0.3 % (ref 0–3)
GFR SERPL CREATININE-BSD FRML MDRD: >60 ML/MIN/1.73M2
GLUCOSE SERPL-MCNC: 125 MG/DL (ref 70–99)
HCT VFR BLD CALC: 38.8 % (ref 42–52)
HEMOGLOBIN: 12.9 GM/DL (ref 13.5–18)
IMMATURE NEUTROPHIL %: 0.9 % (ref 0–0.43)
LYMPHOCYTES ABSOLUTE: 2.8 K/CU MM
LYMPHOCYTES RELATIVE PERCENT: 26.5 % (ref 24–44)
Lab: NORMAL
Lab: NORMAL
MCH RBC QN AUTO: 30.7 PG (ref 27–31)
MCHC RBC AUTO-ENTMCNC: 33.2 % (ref 32–36)
MCV RBC AUTO: 92.4 FL (ref 78–100)
MONOCYTES ABSOLUTE: 0.7 K/CU MM
MONOCYTES RELATIVE PERCENT: 6.3 % (ref 0–4)
NUCLEATED RBC %: 0 %
PDW BLD-RTO: 13.2 % (ref 11.7–14.9)
PLATELET # BLD: 254 K/CU MM (ref 140–440)
PMV BLD AUTO: 10.2 FL (ref 7.5–11.1)
POTASSIUM SERPL-SCNC: 4.1 MMOL/L (ref 3.5–5.1)
RBC # BLD: 4.2 M/CU MM (ref 4.6–6.2)
SEGMENTED NEUTROPHILS ABSOLUTE COUNT: 6.9 K/CU MM
SEGMENTED NEUTROPHILS RELATIVE PERCENT: 65.8 % (ref 36–66)
SODIUM BLD-SCNC: 140 MMOL/L (ref 135–145)
SPECIMEN: NORMAL
SPECIMEN: NORMAL
TOTAL IMMATURE NEUTOROPHIL: 0.09 K/CU MM
TOTAL NUCLEATED RBC: 0 K/CU MM
WBC # BLD: 10.5 K/CU MM (ref 4–10.5)

## 2023-02-02 PROCEDURE — 99232 SBSQ HOSP IP/OBS MODERATE 35: CPT | Performed by: INTERNAL MEDICINE

## 2023-02-02 PROCEDURE — 80048 BASIC METABOLIC PNL TOTAL CA: CPT

## 2023-02-02 PROCEDURE — 99231 SBSQ HOSP IP/OBS SF/LOW 25: CPT | Performed by: NURSE PRACTITIONER

## 2023-02-02 PROCEDURE — 93010 ELECTROCARDIOGRAM REPORT: CPT | Performed by: INTERNAL MEDICINE

## 2023-02-02 PROCEDURE — 2580000003 HC RX 258: Performed by: STUDENT IN AN ORGANIZED HEALTH CARE EDUCATION/TRAINING PROGRAM

## 2023-02-02 PROCEDURE — 6370000000 HC RX 637 (ALT 250 FOR IP): Performed by: INTERNAL MEDICINE

## 2023-02-02 PROCEDURE — 6370000000 HC RX 637 (ALT 250 FOR IP): Performed by: STUDENT IN AN ORGANIZED HEALTH CARE EDUCATION/TRAINING PROGRAM

## 2023-02-02 PROCEDURE — 94762 N-INVAS EAR/PLS OXIMTRY CONT: CPT

## 2023-02-02 PROCEDURE — 2060000000 HC ICU INTERMEDIATE R&B

## 2023-02-02 PROCEDURE — 6370000000 HC RX 637 (ALT 250 FOR IP): Performed by: NURSE PRACTITIONER

## 2023-02-02 PROCEDURE — 36415 COLL VENOUS BLD VENIPUNCTURE: CPT

## 2023-02-02 PROCEDURE — 6360000002 HC RX W HCPCS: Performed by: NURSE PRACTITIONER

## 2023-02-02 PROCEDURE — 94761 N-INVAS EAR/PLS OXIMETRY MLT: CPT

## 2023-02-02 PROCEDURE — 86140 C-REACTIVE PROTEIN: CPT

## 2023-02-02 PROCEDURE — 6360000002 HC RX W HCPCS: Performed by: STUDENT IN AN ORGANIZED HEALTH CARE EDUCATION/TRAINING PROGRAM

## 2023-02-02 PROCEDURE — 94618 PULMONARY STRESS TESTING: CPT

## 2023-02-02 PROCEDURE — 94640 AIRWAY INHALATION TREATMENT: CPT

## 2023-02-02 PROCEDURE — 85025 COMPLETE CBC W/AUTO DIFF WBC: CPT

## 2023-02-02 RX ORDER — HYDRALAZINE HYDROCHLORIDE 20 MG/ML
10 INJECTION INTRAMUSCULAR; INTRAVENOUS ONCE
Status: COMPLETED | OUTPATIENT
Start: 2023-02-02 | End: 2023-02-02

## 2023-02-02 RX ORDER — AMLODIPINE BESYLATE 10 MG/1
10 TABLET ORAL DAILY
Status: DISCONTINUED | OUTPATIENT
Start: 2023-02-02 | End: 2023-02-03 | Stop reason: HOSPADM

## 2023-02-02 RX ADMIN — Medication 10 ML: at 08:56

## 2023-02-02 RX ADMIN — HYDRALAZINE HYDROCHLORIDE 10 MG: 20 INJECTION INTRAMUSCULAR; INTRAVENOUS at 02:31

## 2023-02-02 RX ADMIN — Medication 10 ML: at 20:30

## 2023-02-02 RX ADMIN — ENOXAPARIN SODIUM 30 MG: 100 INJECTION SUBCUTANEOUS at 08:54

## 2023-02-02 RX ADMIN — IPRATROPIUM BROMIDE AND ALBUTEROL SULFATE 1 AMPULE: 2.5; .5 SOLUTION RESPIRATORY (INHALATION) at 20:16

## 2023-02-02 RX ADMIN — LINEZOLID 600 MG: 600 TABLET, FILM COATED ORAL at 20:29

## 2023-02-02 RX ADMIN — FLUTICASONE PROPIONATE 1 SPRAY: 50 SPRAY, METERED NASAL at 08:55

## 2023-02-02 RX ADMIN — AMLODIPINE BESYLATE 10 MG: 10 TABLET ORAL at 08:53

## 2023-02-02 RX ADMIN — IPRATROPIUM BROMIDE AND ALBUTEROL SULFATE 1 AMPULE: 2.5; .5 SOLUTION RESPIRATORY (INHALATION) at 08:38

## 2023-02-02 RX ADMIN — ENOXAPARIN SODIUM 30 MG: 100 INJECTION SUBCUTANEOUS at 20:30

## 2023-02-02 RX ADMIN — METHYLPREDNISOLONE SODIUM SUCCINATE 40 MG: 40 INJECTION, POWDER, FOR SOLUTION INTRAMUSCULAR; INTRAVENOUS at 08:53

## 2023-02-02 RX ADMIN — LINEZOLID 600 MG: 600 TABLET, FILM COATED ORAL at 08:54

## 2023-02-02 ASSESSMENT — PAIN SCALES - GENERAL: PAINLEVEL_OUTOF10: 0

## 2023-02-02 NOTE — PROGRESS NOTES
Patient was seen in hospital for  Pneumonia    . I am prescribing oxygen because the diagnosis and testing requires the patient to have oxygen in the home. Conditions will improve or be benefited by oxygen use. The patient is able to perform good mobility and therefore requires the use of a portable oxygen system for ambulation.

## 2023-02-02 NOTE — PROGRESS NOTES
Today's plan: Okay for discharge from cardiac standpoint recommend not to start marijuana  as it can cause bradycardia  Patient stress test was limited resolved    Admit Date:  1/27/2023    Subjective: Better      Chief complaints on admission pneumonia        History of present illness:Perry is a 60 y. o.year old who  presents with had no chief complaint listed for this encounter. Past medical history:    has a past medical history of Bilateral hand pain, Pneumonia, Rheumatoid arthritis ([address] and Smoker. Past surgical history:   has no past surgical history on file. Social History:   reports that he has been smoking cigarettes. He has been smoking an average of .5 packs per day. He has never used smokeless tobacco. He reports current alcohol use. He reports that he does not use drugs. Family history:  family history includes Allergies in his mother; Asthma in his father; Cancer in his mother; Diabetes in his brother, mother, and sister; Heart Failure in his mother; High Blood Pressure in his sister; High Cholesterol in his mother; Hypertension in his brother and mother; Mental Illness in his brother; Migraines in his mother; No Known Problems in his sister, sister, and sister; Osteoarthritis in his father; Rheum Arthritis in his father. Allergies   Allergen Reactions    Pcn [Penicillins]          Objective:   BP (!) 161/62   Pulse 80   Temp 98.6 °F (37 °C) (Oral)   Resp 16   Ht 6' 3\" (1.905 m)   Wt 226 lb 9.6 oz (102.8 kg)   SpO2 (!) 87%   BMI 28.32 kg/m²   No intake or output data in the 24 hours ending 02/02/23 1434    TELEMETRY:      Physical Exam:  Constitutional:  Well developed, Well nourished, No acute distress, Non-toxic appearance. HENT:  Normocephalic, Atraumatic, Bilateral external ears normal, Oropharynx moist, No oral exudates, Nose normal. Neck- Normal range of motion, No tenderness, Supple, No stridor. Eyes:  PERRL, EOMI, Conjunctiva normal, No discharge.    Respiratory: Normal breath sounds, No respiratory distress, No wheezing, No chest tenderness. ,no use of accessory muscles, diaphragm movement is normal  Cardiovascular: (PMI) apex non displaced,no lifts no thrills, no s3,no s4, Normal heart rate, Normal rhythm, No murmurs, No rubs, No gallops. Carotid arteries pulse and amplitude are normal no bruit, no abdominal bruit noted ( normal abdominal aorta ausculation), femoral arteries pulse and amplitude are normal no bruit, pedal pulses are normal  GI:  Bowel sounds normal, Soft, No tenderness, No masses, No pulsatile masses. : External genitalia appear normal, No masses or lesions. No discharge. No CVA tenderness. Musculoskeletal:  Intact distal pulses, No edema, No tenderness, No cyanosis, No clubbing. Good range of motion in all major joints. No tenderness to palpation or major deformities noted. Back- No tenderness. Integument:  Warm, Dry, No erythema, No rash. Lymphatic:  No lymphadenopathy noted. Neurologic:  Alert & oriented x 3, Normal motor function, Normal sensory function, No focal deficits noted.    Psychiatric:  Affect normal, Judgment normal, Mood normal.     Medications:    amLODIPine  10 mg Oral Daily    linezolid  600 mg Oral 2 times per day    ipratropium-albuterol  1 ampule Inhalation BID    methylPREDNISolone  40 mg IntraVENous Daily    fluticasone  1 spray Each Nostril Daily    sodium chloride flush  5-40 mL IntraVENous 2 times per day    enoxaparin  30 mg SubCUTAneous BID    nicotine  1 patch TransDERmal Daily      sodium chloride       atropine, sodium chloride flush, sodium chloride, polyethylene glycol, ipratropium-albuterol, guaiFENesin-dextromethorphan    Lab Data:  CBC:   Recent Labs     01/31/23 0111 02/01/23 0621 02/02/23 0358   WBC 5.4 9.5 10.5   HGB 12.0* 12.6* 12.9*   HCT 35.9* 39.1* 38.8*   MCV 92.5 94.0 92.4    229 254     BMP:   Recent Labs     01/31/23 0111 02/01/23 0621 02/02/23 0358    141 140   K 4.4 4.5 4.1   CL 106 107 106   CO2 22 25 22   BUN 27* 20 23   CREATININE 1.1 1.0 1.0     LIVER PROFILE: No results for input(s): AST, ALT, LIPASE, BILIDIR, BILITOT, ALKPHOS in the last 72 hours. Invalid input(s): AMYLASE,  ALB  PT/INR: No results for input(s): PROTIME, INR in the last 72 hours. APTT: No results for input(s): APTT in the last 72 hours. BNP:  No results for input(s): BNP in the last 72 hours. TROPONIN: @TROPONINI:3@      Assessment:  60 y. o.year old who is admitted for          Plan:  Sinus bradycardia most likely secondary to marijuana echo is normal he is having pneumonia now would not like to stressed at this time, once he recovers from his pneumonia and does not require oxygen will try treadmill stress test which can be done as an outpatient, will also recommend outpatient 30-day event monitor  History rheumatoid arthritis stable  All labs, medications and tests reviewed, continue all other medications of all above medical condition listed as is.       Leandra Irvin MD, MD 2/2/2023 2:34 PM

## 2023-02-02 NOTE — TELEPHONE ENCOUNTER
LM to schedule[de-identified]   sarah 30 day event monitor   8 wk OV with sarah    Pt was currently admitted to [facility]

## 2023-02-02 NOTE — PROGRESS NOTES
pulmonary      SUBJECTIVE:  doing very well and wants dc     OBJECTIVE    VITALS:  /68   Pulse 53   Temp 98.4 °F (36.9 °C) (Oral)   Resp 13   Ht 6' 3\" (1.905 m)   Wt 226 lb 9.6 oz (102.8 kg)   SpO2 92%   BMI 28.32 kg/m²   HEAD AND FACE EXAM:  No throat injection, no active exudate,no thrush  NECK EXAM;No JVD, no masses, symmetrical  CHEST EXAM; Expansion equal and symmetrical, no masses  LUNG EXAM; Good breath sounds bilaterally. There are expiratory wheezes both lungs, there are crackles at both lung bases  CARDIOVASCULAR EXAM: Positive S1 and S2, no S3 or S4, no clicks ,no murmurs  RIGHT AND LEFT LOWER EXTRIMITY EXAM: No edema, no swelling, no inflamation  CNS EXAM: Alert and oriented X3          LABS   Lab Results   Component Value Date    WBC 10.5 02/02/2023    HGB 12.9 (L) 02/02/2023    HCT 38.8 (L) 02/02/2023    MCV 92.4 02/02/2023     02/02/2023     Lab Results   Component Value Date    CREATININE 1.0 02/02/2023    BUN 23 02/02/2023     02/02/2023    K 4.1 02/02/2023     02/02/2023    CO2 22 02/02/2023     Lab Results   Component Value Date    INR 1.21 01/28/2023    PROTIME 15.7 (H) 01/28/2023        No results found for: PHOS   No results for input(s): PH, PO2ART, OCV0PXI, HCO3, BEART, O2SAT in the last 72 hours.       Wt Readings from Last 3 Encounters:   02/02/23 226 lb 9.6 oz (102.8 kg)   07/20/21 230 lb (104.3 kg)   01/18/18 220 lb 3.2 oz (99.9 kg)               ASSESMENT  Mssa pneumonia        PLAN  Cont antibx  Now off o2    2/2/2023  Lelo Gomez MD, M.D.

## 2023-02-02 NOTE — PROGRESS NOTES
Infectious Disease Progress Note  2023   Patient Name: Diana Molina : 1960   Impression  Sepsis Secondary to MSSA Multifocal Pneumonia Complicated by Acute Hypoxic Respiratory Failure:  Human [place]-Pneumovirus by PCR:  Afebrile, no leukocytosis, CRP and Pct on DWT, patient has clinically improved, now on room air  -BC 0/2-NGTD  CrCl 100  -Resp culture: MSSA  -Legionella ag, Strep pneumo negative  -Human [place]-pneumovirus Positive by PCR  -Urine culture: NGTD  -MRSA/MSSA screen Pending  -CTA Pulmonary W Contrast: 1. Motion limited exam.   2. No acute cardiopulmonary disease. 3. Multifocal pneumonia, right worse than left. Dr. Tyler Guthrie consulted for pulmonology   RA:  Reports mgt per Dr. Bonnetta Kayser with po Naproxen and Tylenol  Reports was on an another oral regimen 2 months ago (unsure of the name)  HTN:  Tobacco Abuse:  Multi-morbidity: per PMHx     Plan:  Continue po linezolid 600 mg bid x 14 day course (end date 2/15/2023)  Follow up with PCP after DC  OK from ID standpoint to DC when ready    Ongoing Antimicrobial Therapy  Linezolid -? Completed Antimicrobial Therapy  Azithromycin -  Ceftriaxone -  Cefazolin -? History:? Interval history noted  Denies n/v/d/f or untoward effects of antibiotics. States is feeling better this am and now off oxygen. Physical Exam:  Vital Signs: /68   Pulse 53   Temp 98.4 °F (36.9 °C) (Oral)   Resp 13   Ht 6' 3\" (1.905 m)   Wt 226 lb 9.6 oz (102.8 kg)   SpO2 92%   BMI 28.32 kg/m²     Gen: remains A&O  Chest: no distress and CTA. Posterior breath sounds diminished bases, clear upper lobes. Room air. Heart: NSR and no MRG. Abd: soft, non-distended, no tenderness, no hepatomegaly. Normoactive bowel sounds. Ext: no clubbing, cyanosis, or edema  Neuro: Mental status intact.  CN 2-12 intact and no focal sensory or motor deficits     Radiologic / Imaging / TESTING  2023 XR Chest Portable:  Impression   1. Multifocal bilateral heterogeneous opacities are more confluent in the   right upper lobe. Differential considerations include multifocal pneumonia   and asymmetric pulmonary edema. Follow-up PA and lateral chest radiographs 6   weeks after the onset of appropriate medical therapy may be helpful to   document improvement. 2.  Borderline cardiomegaly. 1/28/2023 CTA Pulmonary W Contrast:  Impression   1. Motion limited exam.   2. No acute cardiopulmonary disease. 3. Multifocal pneumonia, right worse than left. 1/30/2023 XR Chest Portable:  Impression   Nonspecific pulmonary infiltrates are commonly seen with atypical/viral   pneumonia. Correlate with recent CTA chest results. Pulmonary sequela typical of that seen with smoking, including COPD.         Labs:    Recent Results (from the past 24 hour(s))   Basic Metabolic Panel w/ Reflex to MG    Collection Time: 02/02/23  3:58 AM   Result Value Ref Range    Sodium 140 135 - 145 MMOL/L    Potassium 4.1 3.5 - 5.1 MMOL/L    Chloride 106 99 - 110 mMol/L    CO2 22 21 - 32 MMOL/L    Anion Gap 12 4 - 16    BUN 23 6 - 23 MG/DL    Creatinine 1.0 0.9 - 1.3 MG/DL    Est, Glom Filt Rate >60 >60 mL/min/1.73m2    Glucose 125 (H) 70 - 99 MG/DL    Calcium 8.6 8.3 - 10.6 MG/DL   CBC with Auto Differential    Collection Time: 02/02/23  3:58 AM   Result Value Ref Range    WBC 10.5 4.0 - 10.5 K/CU MM    RBC 4.20 (L) 4.6 - 6.2 M/CU MM    Hemoglobin 12.9 (L) 13.5 - 18.0 GM/DL    Hematocrit 38.8 (L) 42 - 52 %    MCV 92.4 78 - 100 FL    MCH 30.7 27 - 31 PG    MCHC 33.2 32.0 - 36.0 %    RDW 13.2 11.7 - 14.9 %    Platelets 756 444 - 731 K/CU MM    MPV 10.2 7.5 - 11.1 FL    Differential Type AUTOMATED DIFFERENTIAL     Segs Relative 65.8 36 - 66 %    Lymphocytes % 26.5 24 - 44 %    Monocytes % 6.3 (H) 0 - 4 %    Eosinophils % 0.3 0 - 3 %    Basophils % 0.2 0 - 1 %    Segs Absolute 6.9 K/CU MM    Lymphocytes Absolute 2.8 K/CU MM    Monocytes Absolute 0.7 K/CU MM    Eosinophils Absolute 0.0 K/CU MM    Basophils Absolute 0.0 K/CU MM    Nucleated RBC % 0.0 %    Total Nucleated RBC 0.0 K/CU MM    Total Immature Neutrophil 0.09 K/CU MM    Immature Neutrophil % 0.9 (H) 0 - 0.43 %   C-Reactive Protein    Collection Time: 02/02/23  3:58 AM   Result Value Ref Range    CRP High Sensitivity 15.6 (H) <5.0 mg/L     CULTURE results: Invalid input(s): BLOOD CULTURE,  URINE CULTURE, SURGICAL CULTURE    Diagnosis:  Patient Active Problem List   Diagnosis    Bilateral hand pain    Tobacco dependency    CAP (community acquired pneumonia) due to Chlamydia species    Pneumonia of both lower lobes due to methicillin susceptible Staphylococcus aureus (MSSA) ([address]    Acute respiratory failure with hypoxia ([address]    Multifocal pneumonia       Active Problems  Principal Problem:    CAP (community acquired pneumonia) due to Chlamydia species  Active Problems:    Pneumonia of both lower lobes due to methicillin susceptible Staphylococcus aureus (MSSA) ([address]    Acute respiratory failure with hypoxia ([address]    Multifocal pneumonia  Resolved Problems:    * No resolved hospital problems. *    Electronically signed by: Electronically signed by YANIQUE Gibson CNP on 2/2/2023 at 11:10 AM

## 2023-02-02 NOTE — PROGRESS NOTES
V2.0  [facility] Hospitalist Progress Note      Name:  Elyse Cason /Age/Sex: 1960  (60 y.o. male)   MRN & CSN:  [id number] & [id number] Encounter Date/Time: 2023 6:24 PM EST    Location:  -A PCP: Mundo Lebron [address] [facility] Day: 7    Assessment and Plan:   Elyse Cason is a 60 y.o. male with no significant past medical history except for tobacco abuse presented with progressively worsening shortness of breath. Acute hypoxic respiratory failure  Patient with no history of asthma or COPD. Patient is a current and longtime smoker. Has respiratory failure with a large oxygen requirement. Pulmonology consulted  Continue  supplemental oxygen: Able to titrate down to room air at rest  Patient did do a desat screen and would have required home oxygen. We will repeat the test tomorrow and likely be able to DC  Titrate supplemental oxygen to target so2 >92%  I will also place the patient on IV steroids and scheduled duo nebs    Sepsis present on admission  Likely secondary to meta pneumovirus with superimposed MSSA pneumonia  CT angiogram of the chest without pulmonary embolism, multiple scattered consolidations concerning for multifocal pneumonia. Sputum culture positive for staph aureus  Transition to p.o. Zyvox  Infectious disease consulted: ABX per ID  Follow-up blood cultures    Bradycardia,  Patient with asymptomatic bradycardia. Did have some 3-second pauses on telemetry. Electrolytes are within normal limits. Cardiology consulted  Will obtain stress test on outpatient. Likely will get a event monitor as an outpatient once sepsis and his pneumonia has cleared. Okay to DC from cardiology standpoint with outpatient follow-up    Tobacco abuse  Nicotine replacement therapy  Counseled regarding cessation    Obstructive sleep apnea  Patient did undergo a sleep study while in house.     Pulmonology following: will follow-up as an outpatient  Will initiate CPAP for home        Diet ADULT DIET; Regular   DVT Prophylaxis [x] Lovenox, []  Heparin, [] SCDs, [] Ambulation,  [] Eliquis, [] Xarelto  [] Coumadin   Code Status Full Code   Disposition From: Home  Expected Disposition: Home  Estimated Date of Discharge: 1 days  Patient requires continued admission due to Respiratory failure   Surrogate Decision Maker/ POA Spouse     Subjective:     Chief Complaint: SOB     Patient was able to be titrated to room air at rest.  He was saturating well and not short of breath, thus we did a desaturation screen for home O2. Patient did drop down into the 80s with ambulation with some shortness of breath. Patient was not on oxygen previously and his trajectory should allow him to ideally be off of oxygen and discharge tomorrow. Objective:   No intake or output data in the 24 hours ending 02/02/23 1429       Vitals:   Vitals:    02/02/23 1238   BP: (!) 161/62   Pulse: 80   Resp: 16   Temp: 98.6 °F (37 °C)   SpO2: (!) 87%       Physical Exam:   Physical Exam  Vitals reviewed. Constitutional:       Appearance: Normal appearance. He is normal weight. HENT:      Head: Normocephalic and atraumatic. Nose: Nose normal.      Mouth/Throat:      Mouth: Mucous membranes are dry. Pharynx: Oropharynx is clear. Eyes:      General: No scleral icterus. Conjunctiva/sclera: Conjunctivae normal.   Cardiovascular:      Rate and Rhythm: Normal rate and regular rhythm. Pulses: Normal pulses. Heart sounds: Normal heart sounds. No murmur heard. Pulmonary:      Effort: Pulmonary effort is normal.      Breath sounds: Rales present. No wheezing or rhonchi. Abdominal:      General: Bowel sounds are normal. There is no distension. Palpations: Abdomen is soft. Tenderness: There is no abdominal tenderness. Musculoskeletal:         General: No deformity. Normal range of motion. Cervical back: Neck supple. No rigidity. Right lower leg: No edema.       Left lower leg: No edema. Skin:     Coloration: Skin is not jaundiced or pale. Neurological:      General: No focal deficit present. Mental Status: He is alert and oriented to person, place, and time. Mental status is at baseline.           Medications:   Medications:    amLODIPine  10 mg Oral Daily    linezolid  600 mg Oral 2 times per day    ipratropium-albuterol  1 ampule Inhalation BID    methylPREDNISolone  40 mg IntraVENous Daily    fluticasone  1 spray Each Nostril Daily    sodium chloride flush  5-40 mL IntraVENous 2 times per day    enoxaparin  30 mg SubCUTAneous BID    nicotine  1 patch TransDERmal Daily      Infusions:    sodium chloride       PRN Meds: atropine, 1 mg, PRN  sodium chloride flush, 5-40 mL, PRN  sodium chloride, , PRN  polyethylene glycol, 17 g, Daily PRN  ipratropium-albuterol, 1 ampule, Q4H PRN  guaiFENesin-dextromethorphan, 5 mL, Q4H PRN      Labs      Recent Results (from the past 24 hour(s))   Basic Metabolic Panel w/ Reflex to MG    Collection Time: 02/02/23  3:58 AM   Result Value Ref Range    Sodium 140 135 - 145 MMOL/L    Potassium 4.1 3.5 - 5.1 MMOL/L    Chloride 106 99 - 110 mMol/L    CO2 22 21 - 32 MMOL/L    Anion Gap 12 4 - 16    BUN 23 6 - 23 MG/DL    Creatinine 1.0 0.9 - 1.3 MG/DL    Est, Glom Filt Rate >60 >60 mL/min/1.73m2    Glucose 125 (H) 70 - 99 MG/DL    Calcium 8.6 8.3 - 10.6 MG/DL   CBC with Auto Differential    Collection Time: 02/02/23  3:58 AM   Result Value Ref Range    WBC 10.5 4.0 - 10.5 K/CU MM    RBC 4.20 (L) 4.6 - 6.2 M/CU MM    Hemoglobin 12.9 (L) 13.5 - 18.0 GM/DL    Hematocrit 38.8 (L) 42 - 52 %    MCV 92.4 78 - 100 FL    MCH 30.7 27 - 31 PG    MCHC 33.2 32.0 - 36.0 %    RDW 13.2 11.7 - 14.9 %    Platelets 087 378 - 244 K/CU MM    MPV 10.2 7.5 - 11.1 FL    Differential Type AUTOMATED DIFFERENTIAL     Segs Relative 65.8 36 - 66 %    Lymphocytes % 26.5 24 - 44 %    Monocytes % 6.3 (H) 0 - 4 %    Eosinophils % 0.3 0 - 3 %    Basophils % 0.2 0 - 1 %    Segs Absolute 6.9 K/CU MM    Lymphocytes Absolute 2.8 K/CU MM    Monocytes Absolute 0.7 K/CU MM    Eosinophils Absolute 0.0 K/CU MM    Basophils Absolute 0.0 K/CU MM    Nucleated RBC % 0.0 %    Total Nucleated RBC 0.0 K/CU MM    Total Immature Neutrophil 0.09 K/CU MM    Immature Neutrophil % 0.9 (H) 0 - 0.43 %   C-Reactive Protein    Collection Time: 02/02/23  3:58 AM   Result Value Ref Range    CRP High Sensitivity 15.6 (H) <5.0 mg/L        Imaging/Diagnostics Last 24 Hours   XR CHEST PORTABLE    Result Date: 1/27/2023  EXAMINATION: ONE XRAY VIEW OF THE CHEST 1/27/2023 10:57 pm COMPARISON: 01/07/2018. HISTORY: ORDERING SYSTEM PROVIDED HISTORY: dyspnea TECHNOLOGIST PROVIDED HISTORY: Reason for exam:->dyspnea Reason for Exam: dyspnea FINDINGS: Frontal portable view of the chest.  Normal lung volume. Multifocal bilateral heterogeneous opacities are more confluent in the right upper lobe. Normal pulmonary vasculature. No pleural effusion or pneumothorax. Borderline cardiomegaly. No acute osseous abnormality. 1.  Multifocal bilateral heterogeneous opacities are more confluent in the right upper lobe. Differential considerations include multifocal pneumonia and asymmetric pulmonary edema. Follow-up PA and lateral chest radiographs 6 weeks after the onset of appropriate medical therapy may be helpful to document improvement. 2.  Borderline cardiomegaly. CTA PULMONARY W CONTRAST    Result Date: 1/29/2023  EXAMINATION: CTA OF THE CHEST 1/28/2023 12:02 am TECHNIQUE: CTA of the chest was performed after the administration of intravenous contrast.  Multiplanar reformatted images are provided for review. MIP images are provided for review. Automated exposure control, iterative reconstruction, and/or weight based adjustment of the mA/kV was utilized to reduce the radiation dose to as low as reasonably achievable.  COMPARISON: January 27, 2023 HISTORY: ORDERING SYSTEM PROVIDED HISTORY: dyspnea/hypoxia TECHNOLOGIST PROVIDED HISTORY: Reason for exam:->dyspnea/hypoxia Decision Support Exception - unselect if not a suspected or confirmed emergency medical condition->Emergency Medical Condition (MA) Reason for Exam: dyspnea/hypoxia FINDINGS: Pulmonary Arteries: Pulmonary arteries are adequately opacified for evaluation. No evidence of intraluminal filling defect to suggest pulmonary embolism. Evaluation is severely limited by motion. Main pulmonary artery is normal in caliber. Mediastinum: The heart is normal in size without a pericardial effusion. The aorta and arch branches are normal in course and caliber. No pathologically enlarged mediastinal or hilar nodes. Lungs/pleura: Severe emphysema. Patchy airspace opacities are identified bilaterally, right greater than left. No pleural effusions. Central airways are patent. Diffuse bronchial wall thickening without bronchiectasis. No dominant or suspicious pulmonary nodules. Upper Abdomen: Small hiatal hernia. Soft Tissues/Bones: No acute bone or soft tissue abnormality. 1. Motion limited exam. 2. No acute cardiopulmonary disease. 3. Multifocal pneumonia, right worse than left.  RECOMMENDATIONS: Unavailable       Electronically signed by Kaushik Adkins MD on 2/2/2023 at 2:29 PM

## 2023-02-02 NOTE — PROGRESS NOTES
Hospitalist CNP notified about patient bp 176/98 and multiple pauses with sinus jonny t0 30's. Onetime hydralazine 10mg administered as ordered.

## 2023-02-02 NOTE — PROGRESS NOTES
Pt home O2 paperwork faxed to [facility]. Please do not discharge pt without oxygen. This testing will be good for 48 hours and will have to be repeated if pt has not discharged prior to then. If portable oxygen concentrator or tank has not been delivered prior to pt being ready to leave, please call PFT @ [phone] or Respiratory Therapy @ [phone]. Thanks.

## 2023-02-02 NOTE — PROGRESS NOTES
2/2/2023 11:00 AM  Patient Room #: 2030/2030-A  Patient Name: Alexei Blount    (Step 1 Done by RN if possible otherwise call Pulmonary Diagnostics)  Place patient on room air at rest for at least 30 minutes. If patient falls below 88% before 30 minutes then you can record the level and stop. Record room air saturation level _87_ %. If patient is at 88% or below, they will qualify for home oxygen and you can stop. If level does not fall below 88%, fill in level above. If indicated continue to Step 2. Signature:___Enid Barrientos RRT__ Date: __02/02/2023_  (Step 2&3 Done by P)  Ambulate patient on room air until saturation falls below 89%. Record level of room air saturation with ambulation___ %. Next, place patient back on ___lpm oxygen and ambulate, record level __%. (Note:  this level must show improvement from room air level done with ambulation.)  If patients saturation on room air with ambulation is 88% or below AND patient shows improvement with oxygen during ambulation, they will qualify for home oxygen and you can stop. If patient does not drop below 89%, then patient should have an overnight oximetry trending on room air to see if level falls below 88%. Complete level in Step 3 below. Room air overnight oximetry level 88 % for___  cumulative minutes. If patients room air oxygen level is < 89% for at least 5 cumulative minutes, patient will qualify for home oxygen and you can stop.         (Attach Night Trending Report)    Complete order below: Diagnosis:__Pneumonia__  Home oxygen at:  Length of Need: ? Lifetime X 3 Months     _2__lpm or __%   via  [x] nasal cannula  []mask  [] other         [x]continuous [x]  with activity  [x]  Nocturnal   [x] Portable Tanks [x]  Concentrator  [] Conserving Device        Therapist Signature:__Enid Barrientos RRT___     Date:  _02/02/2023__  Physician Signature:  __Electronically Signed in EMR_    Date:___  Ordering User: Mateo Núñez MD Maria Luz  Provider ID: [id number]  NPI:  [id number]  [x] Patient Qualifies      [] Patient Does NOT qualify

## 2023-02-03 VITALS
BODY MASS INDEX: 28.17 KG/M2 | TEMPERATURE: 97.9 F | WEIGHT: 226.6 LBS | HEIGHT: 75 IN | DIASTOLIC BLOOD PRESSURE: 64 MMHG | HEART RATE: 89 BPM | RESPIRATION RATE: 13 BRPM | OXYGEN SATURATION: 96 % | SYSTOLIC BLOOD PRESSURE: 156 MMHG

## 2023-02-03 LAB
ANION GAP SERPL CALCULATED.3IONS-SCNC: 10 MMOL/L (ref 4–16)
BUN SERPL-MCNC: 21 MG/DL (ref 6–23)
CALCIUM SERPL-MCNC: 8.9 MG/DL (ref 8.3–10.6)
CHLORIDE BLD-SCNC: 107 MMOL/L (ref 99–110)
CO2: 25 MMOL/L (ref 21–32)
CREAT SERPL-MCNC: 1.1 MG/DL (ref 0.9–1.3)
GFR SERPL CREATININE-BSD FRML MDRD: >60 ML/MIN/1.73M2
GLUCOSE SERPL-MCNC: 83 MG/DL (ref 70–99)
HCT VFR BLD CALC: 41.5 % (ref 42–52)
HEMOGLOBIN: 13.6 GM/DL (ref 13.5–18)
MAGNESIUM: 2.3 MG/DL (ref 1.8–2.4)
MCH RBC QN AUTO: 30.6 PG (ref 27–31)
MCHC RBC AUTO-ENTMCNC: 32.8 % (ref 32–36)
MCV RBC AUTO: 93.3 FL (ref 78–100)
PDW BLD-RTO: 13.2 % (ref 11.7–14.9)
PLATELET # BLD: 307 K/CU MM (ref 140–440)
PMV BLD AUTO: 10.1 FL (ref 7.5–11.1)
POTASSIUM SERPL-SCNC: 4.6 MMOL/L (ref 3.5–5.1)
RBC # BLD: 4.45 M/CU MM (ref 4.6–6.2)
SODIUM BLD-SCNC: 142 MMOL/L (ref 135–145)
WBC # BLD: 13.4 K/CU MM (ref 4–10.5)

## 2023-02-03 PROCEDURE — 6370000000 HC RX 637 (ALT 250 FOR IP): Performed by: INTERNAL MEDICINE

## 2023-02-03 PROCEDURE — 36415 COLL VENOUS BLD VENIPUNCTURE: CPT

## 2023-02-03 PROCEDURE — 6370000000 HC RX 637 (ALT 250 FOR IP): Performed by: NURSE PRACTITIONER

## 2023-02-03 PROCEDURE — 6370000000 HC RX 637 (ALT 250 FOR IP): Performed by: STUDENT IN AN ORGANIZED HEALTH CARE EDUCATION/TRAINING PROGRAM

## 2023-02-03 PROCEDURE — 85027 COMPLETE CBC AUTOMATED: CPT

## 2023-02-03 PROCEDURE — 2580000003 HC RX 258: Performed by: STUDENT IN AN ORGANIZED HEALTH CARE EDUCATION/TRAINING PROGRAM

## 2023-02-03 PROCEDURE — 94640 AIRWAY INHALATION TREATMENT: CPT

## 2023-02-03 PROCEDURE — 94150 VITAL CAPACITY TEST: CPT

## 2023-02-03 PROCEDURE — 80048 BASIC METABOLIC PNL TOTAL CA: CPT

## 2023-02-03 PROCEDURE — 83735 ASSAY OF MAGNESIUM: CPT

## 2023-02-03 PROCEDURE — 94761 N-INVAS EAR/PLS OXIMETRY MLT: CPT

## 2023-02-03 PROCEDURE — 94664 DEMO&/EVAL PT USE INHALER: CPT

## 2023-02-03 PROCEDURE — 99231 SBSQ HOSP IP/OBS SF/LOW 25: CPT | Performed by: NURSE PRACTITIONER

## 2023-02-03 RX ORDER — AMLODIPINE BESYLATE 10 MG/1
10 TABLET ORAL DAILY
Qty: 30 TABLET | Refills: 0 | Status: SHIPPED | OUTPATIENT
Start: 2023-02-04

## 2023-02-03 RX ORDER — PREDNISONE 10 MG/1
40 TABLET ORAL DAILY
Status: DISCONTINUED | OUTPATIENT
Start: 2023-02-03 | End: 2023-02-03 | Stop reason: HOSPADM

## 2023-02-03 RX ORDER — PREDNISONE 20 MG/1
40 TABLET ORAL DAILY
Qty: 8 TABLET | Refills: 0 | Status: SHIPPED | OUTPATIENT
Start: 2023-02-04 | End: 2023-02-08

## 2023-02-03 RX ORDER — LINEZOLID 600 MG/1
600 TABLET, FILM COATED ORAL EVERY 12 HOURS SCHEDULED
Qty: 24 TABLET | Refills: 0 | Status: SHIPPED | OUTPATIENT
Start: 2023-02-03 | End: 2023-02-15

## 2023-02-03 RX ORDER — FLUTICASONE PROPIONATE 50 MCG
1 SPRAY, SUSPENSION (ML) NASAL DAILY
Qty: 16 G | Refills: 0 | Status: SHIPPED | OUTPATIENT
Start: 2023-02-04

## 2023-02-03 RX ADMIN — LINEZOLID 600 MG: 600 TABLET, FILM COATED ORAL at 09:09

## 2023-02-03 RX ADMIN — Medication 10 ML: at 09:10

## 2023-02-03 RX ADMIN — IPRATROPIUM BROMIDE AND ALBUTEROL SULFATE 1 AMPULE: 2.5; .5 SOLUTION RESPIRATORY (INHALATION) at 08:29

## 2023-02-03 RX ADMIN — AMLODIPINE BESYLATE 10 MG: 10 TABLET ORAL at 09:06

## 2023-02-03 RX ADMIN — PREDNISONE 40 MG: 10 TABLET ORAL at 09:06

## 2023-02-03 RX ADMIN — FLUTICASONE PROPIONATE 1 SPRAY: 50 SPRAY, METERED NASAL at 09:10

## 2023-02-03 NOTE — RT PROTOCOL NOTE
RT Inhaler-Nebulizer Bronchodilator Protocol Note    There is a bronchodilator order in the chart from a provider indicating to follow the RT Bronchodilator Protocol and there is an Initiate RT Inhaler-Nebulizer Bronchodilator Protocol order as well (see protocol at bottom of note). CXR Findings:  No results found. The findings from the last RT Protocol Assessment were as follows:   History Pulmonary Disease: Smoker 15 pack years or more  Respiratory Pattern: Dyspnea on exertion or RR 21-25 bpm  Breath Sounds: Slightly diminished and/or crackles  Cough: Strong, spontaneous, non-productive  Indication for Bronchodilator Therapy: Decreased or absent breath sounds  Bronchodilator Assessment Score: 5    Aerosolized bronchodilator medication orders have been revised according to the RT Inhaler-Nebulizer Bronchodilator Protocol below. Respiratory Therapist to perform RT Therapy Protocol Assessment initially then follow the protocol. Repeat RT Therapy Protocol Assessment PRN for score 0-3 or on second treatment, BID, and PRN for scores above 3. No Indications - adjust the frequency to every 6 hours PRN wheezing or bronchospasm, if no treatments needed after 48 hours then discontinue using Per Protocol order mode. If indication present, adjust the RT bronchodilator orders based on the Bronchodilator Assessment Score as indicated below. Use Inhaler orders unless patient has one or more of the following: on home nebulizer, not able to hold breath for 10 seconds, is not alert and oriented, cannot activate and use MDI correctly, or respiratory rate 25 breaths per minute or more, then use the equivalent nebulizer order(s) with same Frequency and PRN reasons based on the score. If a patient is on this medication at home then do not decrease Frequency below that used at home.     0-3 - enter or revise RT bronchodilator order(s) to equivalent RT Bronchodilator order with Frequency of every 4 hours PRN for wheezing or increased work of breathing using Per Protocol order mode.        4-6 - enter or revise RT Bronchodilator order(s) to two equivalent RT bronchodilator orders with one order with BID Frequency and one order with Frequency of every 4 hours PRN wheezing or increased work of breathing using Per Protocol order mode.        7-10 - enter or revise RT Bronchodilator order(s) to two equivalent RT bronchodilator orders with one order with TID Frequency and one order with Frequency of every 4 hours PRN wheezing or increased work of breathing using Per Protocol order mode.       11-13 - enter or revise RT Bronchodilator order(s) to one equivalent RT bronchodilator order with QID Frequency and an Albuterol order with Frequency of every 4 hours PRN wheezing or increased work of breathing using Per Protocol order mode.      Greater than 13 - enter or revise RT Bronchodilator order(s) to one equivalent RT bronchodilator order with every 4 hours Frequency and an Albuterol order with Frequency of every 2 hours PRN wheezing or increased work of breathing using Per Protocol order mode.     RT to enter RT Home Evaluation for COPD & MDI Assessment order using Per Protocol order mode.    Electronically signed by Lesley Escoto RCP on 2/3/2023 at 8:34 AM

## 2023-02-03 NOTE — PROGRESS NOTES
Outpatient Pharmacy Progress Note for Meds-to-Beds    Total number of Prescriptions Filled: 5  The following medications were dispensed to the patient during the discharge process:  Linezolid 600mg  Combivnet respimat  Amlodipine 10mg  Fluticasone nasal  Prednisone 20mg    Additional Documentation:  Patient picked-up the medication(s) in the OP Pharmacy      Thank you for letting us serve your patients.   [address]    [address], [address] [facility]    Phone: [phone]    Fax: [phone]

## 2023-02-03 NOTE — DISCHARGE SUMMARY
Discharge Summary    Name:  Fransico Buckley /Age/Sex: 1960  (60 y.o. male)   MRN & CSN:  [id number] & [id number] Admission Date/Time: 2023 10:40 PM   Attending:  Huy Diaz MD Discharging Physician: Huy Diaz MD       Admission Diagnosis:   Sepsis and acute hypoxemic respiratory failure secondary multifocal community-acquired pneumonia  HTN  Tobacco use disorder    Discharge Diagnosis:  Acute hypoxic respiratory failure 2/2 possibly underlying COPD with exacerbation Exacerbation, MSSA Pneumonia, Human Metapneumovirus infection with Home O2 recommended   Sepsis present on admission 2/2 MSSA Pneumonia and Human Metapneumovirus   Bradycardia - Asymptomatic - with 1st degree AV block  Tobacco use disorder   CRUZ   Leukocytosis       Discharge Exam  BP (!) 156/64   Pulse 89   Temp 97.9 °F (36.6 °C) (Oral)   Resp 13   Ht 6' 3\" (1.905 m)   Wt 226 lb 9.6 oz (102.8 kg)   SpO2 96%   BMI 28.32 kg/m²   Physical Exam  Vitals and nursing note reviewed. Constitutional:       General: He is not in acute distress. Appearance: He is not ill-appearing, toxic-appearing or diaphoretic. Comments: Not on O2 when seen    HENT:      Head: Normocephalic and atraumatic. Right Ear: External ear normal.      Left Ear: External ear normal.      Nose: Nose normal.      Mouth/Throat:      Mouth: Mucous membranes are moist.      Pharynx: Oropharynx is clear. Eyes:      General: No scleral icterus. Right eye: No discharge. Left eye: No discharge. Conjunctiva/sclera: Conjunctivae normal.      Pupils: Pupils are equal, round, and reactive to light. Cardiovascular:      Rate and Rhythm: Normal rate. Rhythm irregularly irregular. Pulses: Normal pulses. Heart sounds: Normal heart sounds. No murmur heard. No friction rub. No gallop. Comments: Appears to have 1st degree AV block with pauses 2-5 seconds.    Pulmonary:      Effort: Pulmonary effort is normal. No respiratory distress. Breath sounds: Normal breath sounds. No stridor. No wheezing, rhonchi or rales. Abdominal:      General: Bowel sounds are normal. There is no distension. Palpations: Abdomen is soft. There is no mass. Tenderness: There is no abdominal tenderness. There is no guarding or rebound. Hernia: No hernia is present. Musculoskeletal:      Right lower leg: No edema. Left lower leg: No edema. Skin:     Capillary Refill: Capillary refill takes less than 2 seconds. Neurological:      Mental Status: He is alert. Hospital Course:   Michell Fontaine is a 60 y.o.  male  who presents with CAP (community acquired pneumonia) due to Chlamydia species    Patient admitted on 1/28/2023    Per H+P:  Patient is a 58-year-old male with a PMHx of HTN who presented to the ED with SOB and productive cough of green sputum x2 days. No fevers/chills, however, endorsed possible sick contacts. Denied any CP, orthopnea, PND, LE edema, presyncope, N/V, abdominal pain, C/D, urinary changes or bleeding. Smokes about half PPD for over x20 years, but denied any alcohol or illicit drug use    Hospital course, assessment and plan:  Acute hypoxic respiratory failure 2/2 possibly underlying COPD with exacerbation Exacerbation, MSSA Pneumonia, Human Metapneumovirus infection with Home O2 recommended   Patient is a current and longtime smoker. Has respiratory failure with O2 requirement. Pulmonology consulted  Continue  supplemental oxygen: Able to titrate down to room air at rest  Patient did do a desat screen and would have required home oxygen.     Titrate supplemental oxygen to target so2 >92%  Patient to be discharged with home O2  Change IV steroid to oral      Sepsis present on admission  Likely secondary to meta pneumovirus with superimposed MSSA pneumonia  CT angiogram of the chest without pulmonary embolism, multiple scattered consolidations concerning for multifocal pneumonia. Required vapotherm for O2, , procal 0.515  Sputum culture positive for staph aureus (MSSA)  On p.o. Zyvox  Infectious disease consulted: ABX per ID  Blood cultures -ve     Bradycardia with 1st Degree AV block,  Patient with asymptomatic bradycardia. Did have some 2-5-second pauses on telemetry. Electrolytes are within normal limits. Cardiology consulted  Will obtain stress test on outpatient. Likely will get a event monitor as an outpatient once sepsis and his pneumonia has cleared. Okay to DC from cardiology standpoint with outpatient follow-up     Tobacco abuse  Nicotine replacement therapy  Counseled regarding cessation     Obstructive sleep apnea  Patient did undergo a sleep study while in house. Pulmonology following: will follow-up as an outpatient  Will initiate CPAP for home    Leukocytosis 2/2 likely Steroids currently     Patient deemed stable enough to be discharged home with close follow up. The patient expressed appropriate understanding of and agreement with the discharge recommendations, medications, and plan. Consults this admission:  IP CONSULT TO PULMONOLOGY  IP CONSULT TO INFECTIOUS DISEASES  IP CONSULT TO CARDIOLOGY      Discharge Instruction:   Handoff to PCP:     Follow up appointments: Cardiology, pulm. With PCP in 1 week. Primary care physician: Discharge instructions as given to patient:   Be compliant with medications  Please take medications as prescribed   Please follow up with your PCP in 1 week. Please follow up with the lung doctor and with the heart doctor.    Please do not smoke marijuana - it can cause your heart rate to go down    Diet:  regular diet   Activity: activity as tolerated  Disposition: Discharged to:   [x]Home, [][facility], []SNF, []Acute Rehab, []Hospice   Condition on discharge: Stable    Discharge Medications:        Medication List        START taking these medications      albuterol-ipratropium  MCG/ACT Aers inhaler  Commonly known as: COMBIVENT RESPIMAT  Inhale 1 puff into the lungs in the morning and 1 puff at noon and 1 puff in the evening and 1 puff before bedtime. fluticasone 50 MCG/ACT nasal spray  Commonly known as: FLONASE  1 spray by Each Nostril route daily  Start taking on: February 4, 2023     linezolid 600 MG tablet  Commonly known as: ZYVOX  Take 1 tablet by mouth every 12 hours for 24 doses     predniSONE 20 MG tablet  Commonly known as: DELTASONE  Take 2 tablets by mouth daily for 4 doses  Start taking on: February 4, 2023            CHANGE how you take these medications      amLODIPine 10 MG tablet  Commonly known as: NORVASC  Take 1 tablet by mouth daily  Start taking on: February 4, 2023  What changed:   medication strength  how much to take            STOP taking these medications      azithromycin 250 MG tablet  Commonly known as: ZITHROMAX     naproxen 500 MG tablet  Commonly known as: NAPROSYN     oseltamivir 75 MG capsule  Commonly known as: TAMIFLU               Where to Get Your Medications        These medications were sent to [address] [place] [address], [address] [id number]      Phone: [phone]   albuterol-ipratropium  MCG/ACT Aers inhaler  amLODIPine 10 MG tablet  fluticasone 50 MCG/ACT nasal spray  linezolid 600 MG tablet  predniSONE 20 MG tablet         Objective Findings at Discharge:       BMP/CBC  Recent Labs     02/01/23  0621 02/02/23  0358 02/03/23  0548    140 142   K 4.5 4.1 4.6    106 107   CO2 25 22 25   BUN 20 23 21   CREATININE 1.0 1.0 1.1   WBC 9.5 10.5 13.4*   HCT 39.1* 38.8* 41.5*    254 307       IMAGING:    CXR 1/30/2023   Nonspecific pulmonary infiltrates are commonly seen with atypical/viral   pneumonia. Correlate with recent CTA chest results. Pulmonary sequela typical of that seen with smoking, including COPD.        CTA Pulm w/ contrast 1/27/2023   1. Motion limited exam.   2. No acute cardiopulmonary disease. 3. Multifocal pneumonia, right worse than left. Additional Information: Patient seen and examined day of discharge.  For more information regarding patient's care please contact [facility] records [phone]    Discharge Time of 40 minutes    Electronically signed by Liza Fowler MD on 2/3/2023 at 11:29 AM

## 2023-02-03 NOTE — PROGRESS NOTES
Physician Progress Note      PATIENT:               Mona Londono  CSN #:                  [id number]  :                       1960  ADMIT DATE:       2023 10:40 PM  [address] [place] DATE:  RESPONDING  PROVIDER #:        Cami CANNON          QUERY TEXT:    Patient admitted with acute hypoxemic respiratory failure. Noted documentation   of sepsis in H&P on . No leukocytosis, afebrile. In order to support the   diagnosis of sepsis, please include additional clinical indicators in your   documentation. Or please document if the diagnosis of sepsis has been ruled   out after further study: The medical record reflects the following:  Risk Factors: pneumonia  Clinical Indicators: WBC 7.7, HR in 60's, procal 0.515, .9  Treatment: IV Zithromax, IV Rocephin    TOM Bang, RN, [facility]  Clinical   [phone]  Options provided:  -- Sepsis present as evidenced by, Please document evidence.   -- Sepsis was ruled out after study  -- Other - I will add my own diagnosis  -- Disagree - Not applicable / Not valid  -- Disagree - Clinically unable to determine / Unknown  -- Refer to Clinical Documentation Reviewer    PROVIDER RESPONSE TEXT:    Sepsis is present as evidenced by MRSA culture positive pneumonia and   respiratory failure requiring vapotherm    Query created by: Waqas Woo on 2023 9:49 AM      Electronically signed by:  Mercy Apodaca 2023 9:07 PM

## 2023-02-03 NOTE — PROGRESS NOTES
Patient heart rate keeps dropping to the 50's and 40's with pauses lasting between 2-5 secs. Patient is alert and oriented x 4. Denies chest pain, diziness and lightheadednes. Deedee Luciano, [facility] notified, labs ordered. Sent Dr. Lee Freire a secured message. Awaiting response from Dr. Lee Freire.

## 2023-02-03 NOTE — PROGRESS NOTES
pulmonary      SUBJECTIVE:  seen early am and doing wedll and sleeping     OBJECTIVE    VITALS:  BP (!) 156/64   Pulse 89   Temp 97.9 °F (36.6 °C) (Oral)   Resp 13   Ht 6' 3\" (1.905 m)   Wt 226 lb 9.6 oz (102.8 kg)   SpO2 96%   BMI 28.32 kg/m²   HEAD AND FACE EXAM:  No throat injection, no active exudate,no thrush  NECK EXAM;No JVD, no masses, symmetrical  CHEST EXAM; Expansion equal and symmetrical, no masses  LUNG EXAM; Good breath sounds bilaterally. There are expiratory wheezes both lungs, there are crackles at both lung bases  CARDIOVASCULAR EXAM: Positive S1 and S2, no S3 or S4, no clicks ,no murmurs  RIGHT AND LEFT LOWER EXTRIMITY EXAM: No edema, no swelling,           LABS   Lab Results   Component Value Date    WBC 13.4 (H) 02/03/2023    HGB 13.6 02/03/2023    HCT 41.5 (L) 02/03/2023    MCV 93.3 02/03/2023     02/03/2023     Lab Results   Component Value Date    CREATININE 1.1 02/03/2023    BUN 21 02/03/2023     02/03/2023    K 4.6 02/03/2023     02/03/2023    CO2 25 02/03/2023     Lab Results   Component Value Date    INR 1.21 01/28/2023    PROTIME 15.7 (H) 01/28/2023        No results found for: PHOS   No results for input(s): PH, PO2ART, DKB1IGB, HCO3, BEART, O2SAT in the last 72 hours.       Wt Readings from Last 3 Encounters:   02/02/23 226 lb 9.6 oz (102.8 kg)   07/20/21 230 lb (104.3 kg)   01/18/18 220 lb 3.2 oz (99.9 kg)               ASSESMENT  Mssa pneumonia  Probable copd as longtime smoker        PLAN  Cpm  Full pft as outpt    2/3/2023  Jordon Castro MD, M.D.

## 2023-02-03 NOTE — PROGRESS NOTES
Discharge education provided to pt, pt verbalizes understanding. Paperwork given to pt, RX at [facility]. I.V removed without complications. Pt to personal transport at this time.

## 2023-02-03 NOTE — PROGRESS NOTES
Infectious Disease Progress Note  2/3/2023   Patient Name: Gill Vaughn : 1960   Impression  Sepsis Secondary to MSSA Multifocal Pneumonia Complicated by Acute Hypoxic Respiratory Failure:  Human [place]-Pneumovirus by PCR:  Bradycardia:  Afebrile, no leukocytosis, CRP and Pct on DWT, patient has clinically improved, now on room air  -BC 0/2-NGTD  CrCl 89  -Resp culture: MSSA  -Legionella ag, Strep pneumo negative  -Human [place]-pneumovirus Positive by PCR  -Urine culture: NGTD  -MRSA/MSSA screen Pending  -CTA Pulmonary W Contrast: 1. Motion limited exam.   2. No acute cardiopulmonary disease. 3. Multifocal pneumonia, right worse than left. Dr. Gisselle Rojas consulted for pulmonology   Sinus jonny, 2/3-event monitor placed   RA:  Reports mgt per Dr. Hakan Merlos with po Naproxen and Tylenol  Reports was on an another oral regimen 2 months ago (unsure of the name)  HTN:  Tobacco Abuse:  Multi-morbidity: per PMHx     Plan:  Continue po linezolid 600 mg bid x 14 day course (end date 2/15/2023)  Follow up with PCP after DC  OK from ID standpoint to DC when ready    Ongoing Antimicrobial Therapy  Linezolid -? Completed Antimicrobial Therapy  Azithromycin -  Ceftriaxone -  Cefazolin -? History:? Interval history noted  Denies n/v/d/f or untoward effects of antibiotics. States is feeling better this am and now off oxygen. Physical Exam:  Vital Signs: BP (!) 156/64   Pulse 89   Temp 97.9 °F (36.6 °C) (Oral)   Resp 13   Ht 6' 3\" (1.905 m)   Wt 226 lb 9.6 oz (102.8 kg)   SpO2 96%   BMI 28.32 kg/m²     Gen: alert and oriented up in the room. Chest: no distress and CTA. Posterior breath sounds diminished bases, clear upper lobes. Room air. Heart: NSR and no MRG. Abd: soft, non-distended, no tenderness, no hepatomegaly. Normoactive bowel sounds. Ext: no clubbing, cyanosis, or edema  Neuro: Mental status intact.  CN 2-12 intact and no focal sensory or motor deficits     Radiologic / Imaging / TESTING  1/27/2023 XR Chest Portable:  Impression   1. Multifocal bilateral heterogeneous opacities are more confluent in the   right upper lobe. Differential considerations include multifocal pneumonia   and asymmetric pulmonary edema. Follow-up PA and lateral chest radiographs 6   weeks after the onset of appropriate medical therapy may be helpful to   document improvement. 2.  Borderline cardiomegaly. 1/28/2023 CTA Pulmonary W Contrast:  Impression   1. Motion limited exam.   2. No acute cardiopulmonary disease. 3. Multifocal pneumonia, right worse than left. 1/30/2023 XR Chest Portable:  Impression   Nonspecific pulmonary infiltrates are commonly seen with atypical/viral   pneumonia. Correlate with recent CTA chest results. Pulmonary sequela typical of that seen with smoking, including COPD.         Labs:    Recent Results (from the past 24 hour(s))   CBC    Collection Time: 02/03/23  5:48 AM   Result Value Ref Range    WBC 13.4 (H) 4.0 - 10.5 K/CU MM    RBC 4.45 (L) 4.6 - 6.2 M/CU MM    Hemoglobin 13.6 13.5 - 18.0 GM/DL    Hematocrit 41.5 (L) 42 - 52 %    MCV 93.3 78 - 100 FL    MCH 30.6 27 - 31 PG    MCHC 32.8 32.0 - 36.0 %    RDW 13.2 11.7 - 14.9 %    Platelets 832 080 - 558 K/CU MM    MPV 10.1 7.5 - 11.1 FL   Basic Metabolic Panel    Collection Time: 02/03/23  5:48 AM   Result Value Ref Range    Sodium 142 135 - 145 MMOL/L    Potassium 4.6 3.5 - 5.1 MMOL/L    Chloride 107 99 - 110 mMol/L    CO2 25 21 - 32 MMOL/L    Anion Gap 10 4 - 16    BUN 21 6 - 23 MG/DL    Creatinine 1.1 0.9 - 1.3 MG/DL    Est, Glom Filt Rate >60 >60 mL/min/1.73m2    Glucose 83 70 - 99 MG/DL    Calcium 8.9 8.3 - 10.6 MG/DL   Magnesium    Collection Time: 02/03/23  5:48 AM   Result Value Ref Range    Magnesium 2.3 1.8 - 2.4 mg/dl     CULTURE results: Invalid input(s): BLOOD CULTURE,  URINE CULTURE, SURGICAL CULTURE    Diagnosis:  Patient Active Problem List   Diagnosis Bilateral hand pain    Tobacco dependency    CAP (community acquired pneumonia) due to Chlamydia species    Pneumonia of both lower lobes due to methicillin susceptible Staphylococcus aureus (MSSA) ([address]    Acute respiratory failure with hypoxia ([address]    Multifocal pneumonia       Active Problems  Principal Problem:    CAP (community acquired pneumonia) due to Chlamydia species  Active Problems:    Pneumonia of both lower lobes due to methicillin susceptible Staphylococcus aureus (MSSA) ([address]    Acute respiratory failure with hypoxia ([address]    Multifocal pneumonia  Resolved Problems:    * No resolved hospital problems. *    Electronically signed by: Electronically signed by Shasha Burns.  YANIQUE Bruno CNP on 2/3/2023 at 11:21 AM

## 2023-02-13 PROBLEM — J44.9 CHRONIC OBSTRUCTIVE PULMONARY DISEASE (HCC): Status: ACTIVE | Noted: 2023-02-13

## 2023-03-29 ENCOUNTER — TELEPHONE (OUTPATIENT)
Dept: CARDIOLOGY CLINIC | Age: 63
End: 2023-03-29

## 2023-03-29 NOTE — TELEPHONE ENCOUNTER
Left message for patient to give office a call back in regards to the status and situation with the Event Monitor he was ordered to have.

## 2023-03-30 NOTE — TELEPHONE ENCOUNTER
Patient was seen at the hospital in early Feb. Rescheduled time to put the patient on a 30 Day Event Monitor, will make a monitor follow up with Dr. Thuan Hercules upon putting monitor on. Patient advised and voices understanding.

## 2023-04-05 ENCOUNTER — NURSE ONLY (OUTPATIENT)
Dept: CARDIOLOGY CLINIC | Age: 63
End: 2023-04-05

## 2023-04-05 DIAGNOSIS — R00.1 BRADYCARDIA: Primary | ICD-10-CM

## 2023-04-05 NOTE — PROGRESS NOTES
30 days e-cardio monitor placed.  # [id number]. Instructed patient on how to record the event and to call monitoring center at [phone] if any problems arise. Instructed patient to disconnect the lead wires from the electrodes before bathing or showering and reattach them afterwards. Instructed patient that the electrodes should be changed every 3 days or if they no longer adhere to the skin. Patient to mail package after the monitor has ended. Patient verbalized understanding.

## 2023-04-06 DIAGNOSIS — R00.1 BRADYCARDIA: ICD-10-CM

## 2023-04-06 NOTE — CONSULTS
CARDIOLOGY CONSULT NOTE   Reason for consultation: Bradycardia and pauses on telemetry    Referring physician:  Amy Zelaya MD     Primary care physician: Edith Frye, APRN - CNP      Dear Amy Zelaya MD   Thanks for the consult. History of present illness:Perry is a 60 y. o.year old who  presents with for shortness of breath which is moderate to severe, intermittent, self limiting, not associated with cough or fever, gets worse with activity and better with rest, he is diagnosed with multifocal pneumonia and started on IV antibiotic, patient is also having bradycardia on telemetry heart rate 45-50, as per the patient he does marijuana which can cause bradycardia      Blood pressure, cholesterol, blood glucose and weight are well controlled. Past medical history:    has a past medical history of Bilateral hand pain, Pneumonia, Rheumatoid arthritis ([address] and Smoker. Past surgical history:   has no past surgical history on file. Social History:   reports that he has been smoking cigarettes. He has been smoking an average of .5 packs per day. He has never used smokeless tobacco. He reports current alcohol use. He reports that he does not use drugs.   Family history:   no family history of CAD, STROKE of DM    Allergies   Allergen Reactions    Pcn [Penicillins]        linezolid (ZYVOX) tablet 600 mg, 2 times per day  ipratropium-albuterol (DUONEB) nebulizer solution 1 ampule, BID  amLODIPine (NORVASC) tablet 5 mg, Daily  methylPREDNISolone sodium (SOLU-MEDROL) injection 40 mg, Daily  fluticasone (FLONASE) 50 MCG/ACT nasal spray 1 spray, Daily  sodium chloride flush 0.9 % injection 5-40 mL, 2 times per day  sodium chloride flush 0.9 % injection 5-40 mL, PRN  0.9 % sodium chloride infusion, PRN  enoxaparin Sodium (LOVENOX) injection 30 mg, BID  polyethylene glycol (GLYCOLAX) packet 17 g, Daily PRN  ipratropium-albuterol (DUONEB) nebulizer solution 1 ampule, Q4H PRN  guaiFENesin-dextromethorphan 

White STORMY Rx#:   





    [id number]   


 


    Sodium Chloride 0.9% 1, 130 1500 





    000 ml @ 130 mls/hr IV .   





    Q7H42M [facility] Rx#:[id number]   


 


    cefTRIAXone 2 gm In  50 





    Sodium Chloride 0.9% 50   





    ml @ 100 mls/hr IVPB Q24H   





    [facility] Rx#:[id number]   


 


  ROBITUSSIN DM) 100-10 MG/5ML syrup 5 mL, Q4H PRN  nicotine (NICODERM CQ) 21 MG/24HR 1 patch, Daily      Current Facility-Administered Medications   Medication Dose Route Frequency Provider Last Rate Last Admin    linezolid (ZYVOX) tablet 600 mg  600 mg Oral 2 times per day Brooklyn Bruno, APRN - CNP        ipratropium-albuterol (DUONEB) nebulizer solution 1 ampule  1 ampule Inhalation BID Goldie Elizondo MD   1 ampule at 02/01/23 0728    amLODIPine (NORVASC) tablet 5 mg  5 mg Oral Daily Amy Lawrence, APRN - CNP   5 mg at 02/01/23 0929    methylPREDNISolone sodium (SOLU-MEDROL) injection 40 mg  40 mg IntraVENous Daily Rubén Braga MD   40 mg at 02/01/23 0928    fluticasone (FLONASE) 50 MCG/ACT nasal spray 1 spray  1 spray Each Nostril Daily Denita Chun MD   1 spray at 02/01/23 0931    sodium chloride flush 0.9 % injection 5-40 mL  5-40 mL IntraVENous 2 times per day Joselo Cruz MD   10 mL at 02/01/23 0929    sodium chloride flush 0.9 % injection 5-40 mL  5-40 mL IntraVENous PRN Joselo Cruz MD   10 mL at 01/29/23 0927    0.9 % sodium chloride infusion   IntraVENous PRN Joselo Cruz MD        enoxaparin Sodium (LOVENOX) injection 30 mg  30 mg SubCUTAneous BID Joselo Cruz MD   30 mg at 02/01/23 0929    polyethylene glycol (GLYCOLAX) packet 17 g  17 g Oral Daily PRN Joselo Cruz MD        ipratropium-albuterol (DUONEB) nebulizer solution 1 ampule  1 ampule Inhalation Q4H PRN Joselo Cruz MD        guaiFENesin-dextromethorphan (ROBITUSSIN DM) 100-10 MG/5ML syrup 5 mL  5 mL Oral Q4H PRN Joselo Cruz MD   5 mL at 01/30/23 2131    nicotine (NICODERM CQ) 21 MG/24HR 1 patch  1 patch TransDERmal Daily Denita Chun MD   1 patch at 02/01/23 0930     Review of Systems:   Constitutional: No Fever or Weight Loss   Eyes: No Decreased Vision  ENT: No Headaches, Hearing Loss or Vertigo  Cardiovascular: No chest pain, +dyspnea on exertion, palpitations or loss of consciousness  Respiratory: +cough or wheezing    Gastrointestinal: No abdominal pain, appetite loss, blood in stools, constipation, diarrhea or heartburn  Genitourinary: No dysuria, trouble voiding, or hematuria  Musculoskeletal:  No gait disturbance, weakness or joint complaints  Integumentary: No rash or pruritis  Neurological: No TIA or stroke symptoms  Psychiatric: No anxiety or depression  Endocrine: No malaise, fatigue or temperature intolerance  Hematologic/Lymphatic: No bleeding problems, blood clots or swollen lymph nodes  Allergic/Immunologic: No nasal congestion or hives  All systems negative except as marked. Physical Examination:    Vitals:    02/01/23 0929   BP: 127/75   Pulse: 45   Resp: 15   Temp: afebrile   SpO2:       Wt Readings from Last 3 Encounters:   01/27/23 235 lb (106.6 kg)   07/20/21 230 lb (104.3 kg)   01/18/18 220 lb 3.2 oz (99.9 kg)     Body mass index is 29.37 kg/m². General Appearance:  No distress, conversant    Constitutional:  Well developed, Well nourished, No acute distress, Non-toxic appearance. HENT:  Normocephalic, Atraumatic, Bilateral external ears normal, Oropharynx moist, No oral exudates, Nose normal. Neck- Normal range of motion, No tenderness, Supple, No stridor,no apical-carotid delay, no carotid bruit  Eyes:  PERRL, EOMI, Conjunctiva normal, No discharge. Respiratory:  Normal breath sounds, No respiratory distress, No wheezing, No chest tenderness. ,no use of accessory muscles, diaphragm movement is normal  Cardiovascular: (PMI) apex non displaced,no lifts no thrills, no s3,no s4, Normal heart rate, Normal rhythm, No murmurs, No rubs, No gallops.  Carotid arteries pulse and amplitude are normal no bruit, no abdominal bruit noted ( normal abdominal aorta ausculation), femoral arteries pulse and amplitude are normal no bruit, pedal pulses are normal  GI:  Bowel sounds normal, Soft, No tenderness, No masses, No pulsatile masses, no hepatosplenomegally, no bruits  : External genitalia appear normal, No masses or lesions. No discharge. No CVA tenderness. Musculoskeletal:  Intact distal pulses, No edema, No tenderness, No cyanosis, No clubbing. Good range of motion in all major joints. No tenderness to palpation or major deformities noted. Back- No tenderness. Integument:  Warm, Dry, No erythema, No rash. Skin: no rash, no ulcers  Lymphatic:  No lymphadenopathy noted. Neurologic:  Alert & oriented x 3, Normal motor function, Normal sensory function, No focal deficits noted. Psychiatric:  Affect normal, Judgment normal, Mood normal.   Lab Review   Recent Labs     02/01/23 0621   WBC 9.5   HGB 12.6*   HCT 39.1*         Recent Labs     02/01/23 0621      K 4.5      CO2 25   BUN 20   CREATININE 1.0     No results for input(s): AST, ALT, ALB, BILIDIR, BILITOT, ALKPHOS in the last 72 hours. No results for input(s): TROPONINI in the last 72 hours. No results found for: BNP  Lab Results   Component Value Date    INR 1.21 01/28/2023    PROTIME 15.7 (H) 01/28/2023         EKG:sinus [email or web address]@[email or web address]    Chest Xray:CONGESTION    ECHO:normal LVEF  Labs, echo, meds reviewed  Assessment: 60 y. o.year old with PMH of  has a past medical history of Bilateral hand pain, Pneumonia, Rheumatoid arthritis ([address] and Smoker. Recommendations:    Sinus bradycardia most likely secondary to marijuana echo is normal he is having pneumonia now would not like to stressed at this time, once he recovers from his pneumonia and does not require oxygen will try treadmill stress test which can be done as an outpatient, will also recommend outpatient 30-day event monitor  History rheumatoid arthritis stable  Health maintenance: exerise and diet  All labs, medications and tests reviewed, continue all other medications of all above medical condition listed as is.          Chiara Lemons MD, 2/1/2023 10:53 AM

## 2023-05-03 ENCOUNTER — HOSPITAL ENCOUNTER (OUTPATIENT)
Dept: OCCUPATIONAL THERAPY | Age: 63
Setting detail: THERAPIES SERIES
Discharge: HOME | End: 2023-05-03
Payer: COMMERCIAL

## 2023-05-03 PROCEDURE — 97166 OT EVAL MOD COMPLEX 45 MIN: CPT

## 2023-05-03 PROCEDURE — 97110 THERAPEUTIC EXERCISES: CPT

## 2023-05-03 PROCEDURE — 97530 THERAPEUTIC ACTIVITIES: CPT

## 2023-05-03 NOTE — PROGRESS NOTES
Therapy Time:   Individual Concurrent Group Co-treatment   Time In 1100         Time Out 1145         Minutes Laz Valverde, OTRL CHT

## 2023-05-03 NOTE — PLAN OF CARE
increase functional use of both hands   Pt will follow thru and be independent with HEP  LTG:  Pt will decrease pain 4-5/10 to increase functional activity tolerance for work   Pt will increase B  10# to increase functional grasp for work and daily activities   Pt will decrease quick dash below 40 for performance improvement. Electronically signed by:  Karlie Haro OT,OTSHANNON/L, 5/3/2023, 3:30 PM    If you have any questions or concerns, please don't hesitate to call.   Thank you for your referral.      Physician Signature:__________________   Date:_____________ Time: _______  By signing above, therapists plan is approved by physician

## 2023-05-03 NOTE — FLOWSHEET NOTE
Treatment/Activity Tolerance:     [x]  Patient tolerated treatment well []  Patient limited by fatique    []  Patient limited by pain []  Patient limited by other medical complications   []  Other:     Goals:   Pt will decrease pain 6-7/10 to increase functional activity tolerance   Pt will increase B index PIP flexion to increase functional use of both hands   Pt will follow thru and be independent with HEP  LTG:  Pt will decrease pain 4-5/10 to increase functional activity tolerance for work   Pt will increase B  10# to increase functional grasp for work and daily activities   Pt will decrease quick dash below 40 for performance improvement.   Patient Requires Follow-up:  [x]  Yes  []  No    Plan: []  Continue per plan of care []  Alter current plan (see comments)   [x]  Plan of care initiated []  Hold pending MD visit []  Discharge    Plan for Next Session:      Electronically signed by:  Claudeen Dubonnet, OT,OTR/L, 5/3/2023, 3:34 PM

## 2023-05-08 ENCOUNTER — TELEPHONE (OUTPATIENT)
Dept: CARDIOLOGY CLINIC | Age: 63
End: 2023-05-08

## 2023-05-08 NOTE — TELEPHONE ENCOUNTER
Per Dr. Wilda Baker, patient needs to see EP ASAP for possible pacemaker. Patient has abn event monitor. Message to EP to schedule.

## 2023-05-09 NOTE — TELEPHONE ENCOUNTER
Left message for patient advising him that he need to go to ED for possible PPM or results of event monitor.      This is 15 days event report, basic rythym is sinus, min hr is 39bpm, average hr is 50, Hr is 79, has advanced intermittent heart block, 2:1 AV block, and intermittent AV dissociation, recommend EP CONSULT for discussion of PACEMAKER ASAP

## 2023-05-10 ENCOUNTER — HOSPITAL ENCOUNTER (OUTPATIENT)
Dept: OCCUPATIONAL THERAPY | Age: 63
Discharge: HOME | End: 2023-05-10

## 2023-05-10 NOTE — FLOWSHEET NOTE
Occupational Therapy  Cancellation/No-show Note  Patient Name:  Julianna Faulkner  :  1960   Date:  5/10/2023  Cancelled visits to date: 1  No-shows to date: 0    For today's appointment patient:  [x]    Cancelled  []    Rescheduled appointment  []    No-show     Reason given by patient:  []    Patient ill  []    Conflicting appointment  []    No transportation    []    Conflict with work  []    No reason given  []    Other:     Comments:  Sarath Self it was his day off so he wasn't coming.     Electronically signed by:  Ruben Howard OT, OTR/L, 5/10/2023, 3:30 PM

## 2023-05-10 NOTE — FLOWSHEET NOTE
Patient called one hour before his appt to discuss OT notes. While on the phone he said he just realized he had an appointment today and would not be coming because \"it's my day off. \"

## 2023-05-10 NOTE — TELEPHONE ENCOUNTER
Attempted to call patient for 2nd time no answer. Left message asking to return my call when available very important that he calls back.

## 2023-05-11 NOTE — TELEPHONE ENCOUNTER
Attempted to call patient for 3rd time no answer. Left message asking to return my call when available very important that he calls back.

## 2023-05-12 NOTE — TELEPHONE ENCOUNTER
Attempted to call patient no answer. Left message asking to return my call when available very important that he calls back.

## 2023-05-17 ENCOUNTER — HOSPITAL ENCOUNTER (OUTPATIENT)
Dept: OCCUPATIONAL THERAPY | Age: 63
Setting detail: THERAPIES SERIES
Discharge: HOME | End: 2023-05-17
Payer: COMMERCIAL

## 2023-05-17 PROCEDURE — 97530 THERAPEUTIC ACTIVITIES: CPT

## 2023-05-17 PROCEDURE — 97110 THERAPEUTIC EXERCISES: CPT

## 2023-05-17 PROCEDURE — 97140 MANUAL THERAPY 1/> REGIONS: CPT

## 2023-05-17 NOTE — FLOWSHEET NOTE
Occupational Therapy Out Patient Daily Treatment Note     [x][address] [address]      [address] [facility]     [address] [facility] [address]       [facility] [address], [address], [address]       [address]     [phone]  [phone] [phone]  ______________________________________________________________________  Date:  2023  Patient Name:  Joshua Sherman    :  1960  Restrictions/Precautions:  General  Diagnosis:   Polyarthralgia  Treatment Diagnosis:   hand pain an d stiffness  Insurance/Certification information:  [facility]  Referring Physician:   Poonam CHANEL CNP  Plan of care signed (Y/N):    Visit# / total visits:    COVID screening questions were asked and patient attested that there had been no contact or symptoms   Pain level: 9/10     Subjective: States swelling is still worse at times and pain continues. Prior Level of Function:  Progressing symptoms past several years with stiffness. Still uses and works  Patient Goals: Decrease symptoms so he can keep working.     Treatment Flowsheet   Right Left   HP x 5 min x x   AROM hand x x   Gentle PROM wrist and hand x x   Digiflex 3# x x   Pinch pin 2# x x   Tendon glides x x                                                                                                Interventions/Modalities used:  [x] Therapeutic Exercise   [x] Modalities:  [x] Therapeutic Activity    [] Ultrasound [] Elec Stimulation   [] Total Motion Release    [] Fluido [] Kinesiotaping  [] Neuromuscular Re-education   [] Ionto [x] Coldpack/hotpack   [x] Instruction in HEP    Other:    Objective Findings: R index PIP flexion 55 L index PIP flexion 15    Communication with other providers: POC to physician    Education provided to patient:Issued and instructed in HEP    Adverse Reactions to treatment:none noted    Time in:  845  Time out:  930  Timed treatment

## 2023-05-24 ENCOUNTER — HOSPITAL ENCOUNTER (OUTPATIENT)
Dept: OCCUPATIONAL THERAPY | Age: 63
Setting detail: THERAPIES SERIES
Discharge: HOME | End: 2023-05-24
Payer: COMMERCIAL

## 2023-05-24 PROCEDURE — 97110 THERAPEUTIC EXERCISES: CPT

## 2023-05-24 PROCEDURE — 97530 THERAPEUTIC ACTIVITIES: CPT

## 2023-05-24 PROCEDURE — 97140 MANUAL THERAPY 1/> REGIONS: CPT

## 2023-05-24 NOTE — FLOWSHEET NOTE
Occupational Therapy Out Patient Daily Treatment Note     [x][place] [address]      [facility]     [address] [facility] [address]       [facility] [address], [address], [address]       [address]     [phone]  [phone] [phone]  ______________________________________________________________________  Date:  2023  Patient Name:  Trevon Washington    :  1960  Restrictions/Precautions:  General  Diagnosis:   Polyarthralgia  Treatment Diagnosis:   hand pain an d stiffness  Insurance/Certification information:  [facility]  Referring Physician:   Rolando CHANEL CNP  Plan of care signed (Y/N):    Visit# / total visits:  3/12  COVID screening questions were asked and patient attested that there had been no contact or symptoms   Pain level: 9/10     Subjective: States he does not think hands are getting better. Prior Level of Function:  Progressing symptoms past several years with stiffness. Still uses and works  Patient Goals: Decrease symptoms so he can keep working.     Treatment Flowsheet   Right Left   HP x 5 min x x   AROM hand x x   Gentle PROM wrist and hand x x   Digiflex 3# x x   Pinch pin 2# x x   Tendon glides x x     Wrist flex and ext 1# X X                                                                                         Interventions/Modalities used:  [x] Therapeutic Exercise   [x] Modalities:  [x] Therapeutic Activity    [] Ultrasound [] Elec Stimulation   [] Total Motion Release    [] Fluido [] Kinesiotaping  [] Neuromuscular Re-education   [] Ionto [x] Coldpack/hotpack   [x] Instruction in HEP    Other:    Objective Findings: R index PIP flexion 60 L index PIP flexion 30  R  15.9  L  15.3#  Communication with other providers: POC to physician    Education provided to patient:Issued and instructed in HEP    Adverse Reactions to treatment:none noted    Time in:

## 2023-05-31 ENCOUNTER — HOSPITAL ENCOUNTER (OUTPATIENT)
Dept: OCCUPATIONAL THERAPY | Age: 63
Setting detail: THERAPIES SERIES
Discharge: HOME | End: 2023-05-31
Payer: COMMERCIAL

## 2023-05-31 PROCEDURE — 97530 THERAPEUTIC ACTIVITIES: CPT

## 2023-05-31 PROCEDURE — 97110 THERAPEUTIC EXERCISES: CPT

## 2023-05-31 NOTE — FLOWSHEET NOTE
Occupational Therapy Out Patient Daily Treatment Note     [x][place] [address]      [facility]     [address] [facility] [address]       [facility] [address], [address], [address]       N[address]     [phone]  [phone] [phone]  ______________________________________________________________________  Date:  2023  Patient Name:  Eileen Downing    :  1960  Restrictions/Precautions:  General  Diagnosis:   Polyarthralgia  Treatment Diagnosis:   hand pain an d stiffness  Insurance/Certification information:  [facility]  Referring Physician:   Lana Barthel Asper APRN CNP  Plan of care signed (Y/N):    Visit# / total visits:    COVID screening questions were asked and patient attested that there had been no contact or symptoms   Pain level: 9/10     Subjective: States he does not think hands are getting better. Prior Level of Function:  Progressing symptoms past several years with stiffness. Still uses and works  Patient Goals: Decrease symptoms so he can keep working. Treatment Flowsheet   Right Left   HP x 5 min x x   AROM hand x x   Gentle PROM wrist and hand x x   Digiflex 3# x x   Pinch pin 2# x x   Tendon glides x x     Wrist flex and ext 1# X X                                                                                         Interventions/Modalities used:  [x] Therapeutic Exercise   [x] Modalities:  [x] Therapeutic Activity    [] Ultrasound [] Elec Stimulation   [] Total Motion Release    [] Fluido [] Kinesiotaping  [] Neuromuscular Re-education   [] Ionto [x] Coldpack/hotpack   [x] Instruction in HEP    Other:    Objective Findings: R index PIP flexion 60 L index PIP flexion 30-same  R  26.7  L  27.2# improved from last week.   Communication with other providers: POC to physician    Education provided to patient:Issued and instructed in HEP    Adverse Reactions to

## 2023-06-21 ENCOUNTER — HOSPITAL ENCOUNTER (OUTPATIENT)
Dept: OCCUPATIONAL THERAPY | Age: 63
Discharge: HOME | End: 2023-06-21

## 2023-06-21 NOTE — FLOWSHEET NOTE
Occupational Therapy  Cancellation/No-show Note  Patient Name:  Fanta Ingram  :  1960   Date:  2023  Cancelled visits to date: 2  No-shows to date: 0    For today's appointment patient:  [x]    Cancelled  []    Rescheduled appointment  []    No-show     Reason given by patient:  []    Patient ill  []    Conflicting appointment  []    No transportation    []    Conflict with work  []    No reason given  []    Other:     Comments:  Said doctor said would not do any good at this time.     Electronically signed by:  Padmini Sanchez OT, OTR/L, 2023, 10:08 AM

## 2023-06-28 ENCOUNTER — HOSPITAL ENCOUNTER (EMERGENCY)
Age: 63
Discharge: HOME | End: 2023-06-28
Attending: EMERGENCY MEDICINE
Payer: COMMERCIAL

## 2023-06-28 VITALS
SYSTOLIC BLOOD PRESSURE: 168 MMHG | WEIGHT: 230 LBS | BODY MASS INDEX: 28.6 KG/M2 | TEMPERATURE: 98.9 F | HEART RATE: 62 BPM | OXYGEN SATURATION: 98 % | DIASTOLIC BLOOD PRESSURE: 76 MMHG | RESPIRATION RATE: 18 BRPM | HEIGHT: 75 IN

## 2023-06-28 DIAGNOSIS — M13.0 POLYARTHRITIS: Primary | ICD-10-CM

## 2023-06-28 DIAGNOSIS — M06.9 RHEUMATOID ARTHRITIS INVOLVING MULTIPLE SITES, UNSPECIFIED WHETHER RHEUMATOID FACTOR PRESENT (HCC): ICD-10-CM

## 2023-06-28 LAB
ALBUMIN SERPL-MCNC: 3.6 GM/DL (ref 3.4–5)
ALP BLD-CCNC: 105 IU/L (ref 40–129)
ALT SERPL-CCNC: 12 U/L (ref 10–40)
ANION GAP SERPL CALCULATED.3IONS-SCNC: 13 MMOL/L (ref 4–16)
AST SERPL-CCNC: 15 IU/L (ref 15–37)
BASOPHILS ABSOLUTE: 0.1 K/CU MM
BASOPHILS RELATIVE PERCENT: 0.5 % (ref 0–1)
BILIRUB SERPL-MCNC: 0.6 MG/DL (ref 0–1)
BUN SERPL-MCNC: 12 MG/DL (ref 6–23)
CALCIUM SERPL-MCNC: 8.8 MG/DL (ref 8.3–10.6)
CHLORIDE BLD-SCNC: 104 MMOL/L (ref 99–110)
CO2: 19 MMOL/L (ref 21–32)
CREAT SERPL-MCNC: 0.9 MG/DL (ref 0.9–1.3)
DIFFERENTIAL TYPE: ABNORMAL
EOSINOPHILS ABSOLUTE: 0.1 K/CU MM
EOSINOPHILS RELATIVE PERCENT: 1.1 % (ref 0–3)
GFR SERPL CREATININE-BSD FRML MDRD: >60 ML/MIN/1.73M2
GLUCOSE SERPL-MCNC: 122 MG/DL (ref 70–99)
HCT VFR BLD CALC: 40.5 % (ref 42–52)
HEMOGLOBIN: 13.1 GM/DL (ref 13.5–18)
IMMATURE NEUTROPHIL %: 0.8 % (ref 0–0.43)
LYMPHOCYTES ABSOLUTE: 1.7 K/CU MM
LYMPHOCYTES RELATIVE PERCENT: 14.1 % (ref 24–44)
MAGNESIUM: 2 MG/DL (ref 1.8–2.4)
MCH RBC QN AUTO: 30.2 PG (ref 27–31)
MCHC RBC AUTO-ENTMCNC: 32.3 % (ref 32–36)
MCV RBC AUTO: 93.3 FL (ref 78–100)
MONOCYTES ABSOLUTE: 1.2 K/CU MM
MONOCYTES RELATIVE PERCENT: 9.9 % (ref 0–4)
NUCLEATED RBC %: 0 %
PDW BLD-RTO: 13.6 % (ref 11.7–14.9)
PLATELET # BLD: 214 K/CU MM (ref 140–440)
PMV BLD AUTO: 10.7 FL (ref 7.5–11.1)
POTASSIUM SERPL-SCNC: 4.1 MMOL/L (ref 3.5–5.1)
RBC # BLD: 4.34 M/CU MM (ref 4.6–6.2)
SEGMENTED NEUTROPHILS ABSOLUTE COUNT: 9 K/CU MM
SEGMENTED NEUTROPHILS RELATIVE PERCENT: 73.6 % (ref 36–66)
SODIUM BLD-SCNC: 136 MMOL/L (ref 135–145)
TOTAL IMMATURE NEUTOROPHIL: 0.1 K/CU MM
TOTAL NUCLEATED RBC: 0 K/CU MM
TOTAL PROTEIN: 7.3 GM/DL (ref 6.4–8.2)
WBC # BLD: 12.2 K/CU MM (ref 4–10.5)

## 2023-06-28 PROCEDURE — 85025 COMPLETE CBC W/AUTO DIFF WBC: CPT

## 2023-06-28 PROCEDURE — 99284 EMERGENCY DEPT VISIT MOD MDM: CPT

## 2023-06-28 PROCEDURE — 83735 ASSAY OF MAGNESIUM: CPT

## 2023-06-28 PROCEDURE — 80053 COMPREHEN METABOLIC PANEL: CPT

## 2023-06-28 PROCEDURE — 96372 THER/PROPH/DIAG INJ SC/IM: CPT

## 2023-06-28 PROCEDURE — 6360000002 HC RX W HCPCS: Performed by: EMERGENCY MEDICINE

## 2023-06-28 PROCEDURE — 6370000000 HC RX 637 (ALT 250 FOR IP): Performed by: EMERGENCY MEDICINE

## 2023-06-28 RX ORDER — PREDNISONE 10 MG/1
TABLET ORAL
Qty: 30 TABLET | Refills: 0 | Status: SHIPPED | OUTPATIENT
Start: 2023-06-28 | End: 2023-07-01

## 2023-06-28 RX ORDER — KETOROLAC TROMETHAMINE 30 MG/ML
30 INJECTION, SOLUTION INTRAMUSCULAR; INTRAVENOUS ONCE
Status: COMPLETED | OUTPATIENT
Start: 2023-06-28 | End: 2023-06-28

## 2023-06-28 RX ORDER — PREDNISONE 5 MG/1
5 TABLET ORAL DAILY
Qty: 30 TABLET | Refills: 0 | Status: SHIPPED | OUTPATIENT
Start: 2023-06-28 | End: 2023-07-28

## 2023-06-28 RX ORDER — PREDNISONE 20 MG/1
40 TABLET ORAL ONCE
Status: COMPLETED | OUTPATIENT
Start: 2023-06-28 | End: 2023-06-28

## 2023-06-28 RX ADMIN — KETOROLAC TROMETHAMINE 30 MG: 30 INJECTION, SOLUTION INTRAMUSCULAR at 16:00

## 2023-06-28 RX ADMIN — PREDNISONE 40 MG: 20 TABLET ORAL at 16:01

## 2023-07-01 ENCOUNTER — HOSPITAL ENCOUNTER (EMERGENCY)
Age: 63
Discharge: HOME | End: 2023-07-01
Attending: EMERGENCY MEDICINE
Payer: COMMERCIAL

## 2023-07-01 VITALS
HEIGHT: 75 IN | SYSTOLIC BLOOD PRESSURE: 157 MMHG | DIASTOLIC BLOOD PRESSURE: 98 MMHG | OXYGEN SATURATION: 96 % | HEART RATE: 70 BPM | TEMPERATURE: 97.6 F | WEIGHT: 230 LBS | BODY MASS INDEX: 28.6 KG/M2 | RESPIRATION RATE: 18 BRPM

## 2023-07-01 DIAGNOSIS — M25.521 RIGHT ELBOW PAIN: ICD-10-CM

## 2023-07-01 DIAGNOSIS — M06.9 RHEUMATOID ARTHRITIS INVOLVING MULTIPLE SITES, UNSPECIFIED WHETHER RHEUMATOID FACTOR PRESENT (HCC): Primary | ICD-10-CM

## 2023-07-01 DIAGNOSIS — M25.531 RIGHT WRIST PAIN: ICD-10-CM

## 2023-07-01 PROCEDURE — 6370000000 HC RX 637 (ALT 250 FOR IP): Performed by: EMERGENCY MEDICINE

## 2023-07-01 PROCEDURE — 99283 EMERGENCY DEPT VISIT LOW MDM: CPT

## 2023-07-01 RX ORDER — OXYCODONE HYDROCHLORIDE AND ACETAMINOPHEN 5; 325 MG/1; MG/1
1 TABLET ORAL EVERY 6 HOURS PRN
Qty: 12 TABLET | Refills: 0 | Status: SHIPPED | OUTPATIENT
Start: 2023-07-01 | End: 2023-07-04

## 2023-07-01 RX ORDER — OXYCODONE HYDROCHLORIDE AND ACETAMINOPHEN 5; 325 MG/1; MG/1
1 TABLET ORAL ONCE
Status: COMPLETED | OUTPATIENT
Start: 2023-07-01 | End: 2023-07-01

## 2023-07-01 RX ORDER — PREDNISONE 10 MG/1
TABLET ORAL
Qty: 33 TABLET | Refills: 0 | Status: SHIPPED | OUTPATIENT
Start: 2023-07-01 | End: 2023-07-21

## 2023-07-01 RX ADMIN — OXYCODONE HYDROCHLORIDE AND ACETAMINOPHEN 1 TABLET: 5; 325 TABLET ORAL at 10:12

## 2023-07-01 ASSESSMENT — LIFESTYLE VARIABLES
HOW MANY STANDARD DRINKS CONTAINING ALCOHOL DO YOU HAVE ON A TYPICAL DAY: PATIENT DOES NOT DRINK
HOW OFTEN DO YOU HAVE A DRINK CONTAINING ALCOHOL: NEVER

## 2023-07-01 ASSESSMENT — PAIN SCALES - GENERAL: PAINLEVEL_OUTOF10: 8

## 2023-07-01 ASSESSMENT — PAIN - FUNCTIONAL ASSESSMENT: PAIN_FUNCTIONAL_ASSESSMENT: 0-10

## 2023-07-01 ASSESSMENT — PAIN DESCRIPTION - ORIENTATION: ORIENTATION: RIGHT

## 2023-07-01 ASSESSMENT — PAIN DESCRIPTION - LOCATION: LOCATION: ARM

## 2023-07-01 ASSESSMENT — PAIN DESCRIPTION - PAIN TYPE: TYPE: ACUTE PAIN

## 2025-02-19 LAB
ALT SERPL-CCNC: 22 U/L (ref 0–60)
AST SERPL-CCNC: 21 U/L (ref 0–55)
BUN / CREAT RATIO: 16 (ref 7–25)
BUN BLDV-MCNC: 16 MG/DL (ref 3–29)
CREAT SERPL-MCNC: 1 MG/DL (ref 0.5–1.2)
HCT VFR BLD CALC: 41.9 % (ref 37.5–51)
HEMOGLOBIN: 13.2 G/DL (ref 13–17.7)
MCH RBC QN AUTO: 28.7 PG (ref 26–34)
MCHC RBC AUTO-ENTMCNC: 31.5 G/DL (ref 30.7–35.5)
MCV RBC AUTO: 91.1 FL (ref 80–100)
PDW BLD-RTO: 17 %
PLATELET # BLD: 247 K/UL (ref 140–400)
PMV BLD AUTO: 11.2 FL (ref 7.2–11.7)
RBC # BLD: 4.6 M/UL (ref 4.14–5.8)
SED RATE, AUTOMATED: 52 MM/HR (ref 0–20)
URIC ACID: 7.3 MG/DL (ref 4–8)
WBC # BLD: 7.6 K/UL (ref 3.5–10.9)

## 2025-07-19 LAB
ALT SERPL-CCNC: 29 U/L (ref 0–60)
AST SERPL-CCNC: 25 U/L (ref 0–55)
BUN / CREAT RATIO: 21 (ref 7–25)
BUN BLDV-MCNC: 29 MG/DL (ref 3–29)
CREAT SERPL-MCNC: 1.4 MG/DL (ref 0.5–1.2)
HCT VFR BLD CALC: 41.5 % (ref 37.5–51)
HEMOGLOBIN: 13.4 G/DL (ref 13–17.7)
MCH RBC QN AUTO: 31.2 PG (ref 26–34)
MCHC RBC AUTO-ENTMCNC: 32.3 G/DL (ref 30.7–35.5)
MCV RBC AUTO: 96.7 FL (ref 80–100)
PDW BLD-RTO: 14.8 %
PLATELET # BLD: 211 K/UL (ref 140–400)
PMV BLD AUTO: 11.3 FL (ref 7.2–11.7)
RBC # BLD: 4.29 M/UL (ref 4.14–5.8)
SED RATE, AUTOMATED: 34 MM/HR (ref 0–20)
WBC # BLD: 8.3 K/UL (ref 3.5–10.9)